# Patient Record
Sex: FEMALE | Race: WHITE | NOT HISPANIC OR LATINO | Employment: UNEMPLOYED | URBAN - NONMETROPOLITAN AREA
[De-identification: names, ages, dates, MRNs, and addresses within clinical notes are randomized per-mention and may not be internally consistent; named-entity substitution may affect disease eponyms.]

---

## 2018-02-28 ENCOUNTER — HOSPITAL ENCOUNTER (OUTPATIENT)
Dept: MAMMOGRAPHY | Facility: HOSPITAL | Age: 58
Discharge: HOME | End: 2018-02-28
Payer: COMMERCIAL

## 2018-02-28 DIAGNOSIS — Z13.9 SCREENING: ICD-10-CM

## 2018-02-28 PROCEDURE — 77063 BREAST TOMOSYNTHESIS BI: CPT

## 2018-02-28 PROCEDURE — 77067 SCR MAMMO BI INCL CAD: CPT

## 2018-08-14 ENCOUNTER — OFFICE VISIT (OUTPATIENT)
Dept: URGENT CARE | Facility: CLINIC | Age: 58
End: 2018-08-14
Payer: COMMERCIAL

## 2018-08-14 VITALS
TEMPERATURE: 98.4 F | OXYGEN SATURATION: 97 % | WEIGHT: 159 LBS | BODY MASS INDEX: 25.55 KG/M2 | HEIGHT: 66 IN | RESPIRATION RATE: 16 BRPM | HEART RATE: 78 BPM | SYSTOLIC BLOOD PRESSURE: 154 MMHG | DIASTOLIC BLOOD PRESSURE: 89 MMHG

## 2018-08-14 DIAGNOSIS — N39.0 URINARY TRACT INFECTION WITH HEMATURIA, SITE UNSPECIFIED: Primary | ICD-10-CM

## 2018-08-14 DIAGNOSIS — R30.0 DYSURIA: ICD-10-CM

## 2018-08-14 DIAGNOSIS — R31.9 URINARY TRACT INFECTION WITH HEMATURIA, SITE UNSPECIFIED: Primary | ICD-10-CM

## 2018-08-14 LAB
SL AMB  POCT GLUCOSE, UA: ABNORMAL
SL AMB LEUKOCYTE ESTERASE,UA: ABNORMAL
SL AMB POCT BILIRUBIN,UA: ABNORMAL
SL AMB POCT BLOOD,UA: ABNORMAL
SL AMB POCT CLARITY,UA: ABNORMAL
SL AMB POCT COLOR,UA: YELLOW
SL AMB POCT KETONES,UA: ABNORMAL
SL AMB POCT NITRITE,UA: ABNORMAL
SL AMB POCT PH,UA: 7.5
SL AMB POCT SPECIFIC GRAVITY,UA: 1
SL AMB POCT URINE PROTEIN: ABNORMAL
SL AMB POCT UROBILINOGEN: 0.2

## 2018-08-14 PROCEDURE — 87086 URINE CULTURE/COLONY COUNT: CPT | Performed by: PHYSICIAN ASSISTANT

## 2018-08-14 PROCEDURE — 99213 OFFICE O/P EST LOW 20 MIN: CPT | Performed by: PHYSICIAN ASSISTANT

## 2018-08-14 PROCEDURE — 81002 URINALYSIS NONAUTO W/O SCOPE: CPT | Performed by: PHYSICIAN ASSISTANT

## 2018-08-14 RX ORDER — FEXOFENADINE HCL 180 MG/1
180 TABLET ORAL DAILY
COMMUNITY

## 2018-08-14 RX ORDER — OMEPRAZOLE 20 MG/1
CAPSULE, DELAYED RELEASE ORAL
COMMUNITY
Start: 2014-03-30 | End: 2018-09-04 | Stop reason: DRUGHIGH

## 2018-08-14 RX ORDER — SULFAMETHOXAZOLE AND TRIMETHOPRIM 800; 160 MG/1; MG/1
1 TABLET ORAL EVERY 12 HOURS SCHEDULED
Qty: 14 TABLET | Refills: 0 | Status: SHIPPED | OUTPATIENT
Start: 2018-08-14 | End: 2018-08-21

## 2018-08-14 NOTE — PROGRESS NOTES
[facility]    NAME: Ricky Meier is a 60 y o  female  : 1960    MRN: [id number]  DATE: 2018  TIME: 12:21 PM    Assessment and Plan   Urinary tract infection with hematuria, site unspecified [N39 0, R31 9]  1  Urinary tract infection with hematuria, site unspecified  sulfamethoxazole-trimethoprim (BACTRIM DS) 800-160 mg per tablet   2  Dysuria  POCT urine dip auto non-scope    Urine culture       Patient Instructions     Patient Instructions   I have prescribed an antibiotic for the infection  Please take the antibiotic as prescribed and finish the entire prescription  I recommend that the patient takes an over the counter probiotic or eats yogurt with live cultures in it [place]) to keep good bacteria in the gut and help prevent diarrhea  If not improving over the next 7-10 days, follow up with PCP  Go to the emergency department if:   You have severe back, side, or abdominal pain  You have fever and shaking chills  You vomit several times in a row  Contact your primary care provider if:   Your symptoms do not go away, even after treatment  Drink more liquids  Liquids help flush out bacteria that may be causing an infection  Chief Complaint     Chief Complaint   Patient presents with    Possible UTI     x 5 days       History of Present Illness   50-year-old female here with urinary frequency, urgency for the last 2-3 days  Patient took over-the-counter azo with minimal relief  Denies nausea, vomiting, diarrhea, fever or chills  No back pain or abdominal pain  Review of Systems   Review of Systems   Constitutional: Negative for activity change, appetite change, chills, diaphoresis, fatigue, fever and unexpected weight change  HENT: Negative for congestion, dental problem, hearing loss, sinus pressure, sneezing, sore throat, tinnitus, trouble swallowing and voice change  Eyes: Negative for photophobia, redness and visual disturbance  Respiratory: Negative for apnea, cough, chest tightness, shortness of breath, wheezing and stridor  Cardiovascular: Negative for chest pain, palpitations and leg swelling  Gastrointestinal: Negative for abdominal distention, abdominal pain, blood in stool, constipation, diarrhea, nausea and vomiting  Endocrine: Negative for cold intolerance, heat intolerance, polydipsia, polyphagia and polyuria  Genitourinary: Positive for dysuria, frequency and urgency  Negative for difficulty urinating, flank pain and hematuria  Musculoskeletal: Negative for arthralgias, back pain, gait problem, joint swelling, myalgias, neck pain and neck stiffness  Skin: Negative for pallor, rash and wound  Neurological: Negative for dizziness, tremors, seizures, speech difficulty, weakness and headaches  Hematological: Negative for adenopathy  Does not bruise/bleed easily  Psychiatric/Behavioral: Negative for agitation, confusion, dysphoric mood and sleep disturbance  The patient is not nervous/anxious  All other systems reviewed and are negative  Current Medications     Current Outpatient Prescriptions:     fexofenadine (ALLEGRA) 180 MG tablet, Take 180 mg by mouth daily, Disp: , Rfl:     omeprazole (PriLOSEC) 20 mg delayed release capsule, Take by mouth, Disp: , Rfl:     sulfamethoxazole-trimethoprim (BACTRIM DS) 800-160 mg per tablet, Take 1 tablet by mouth every 12 (twelve) hours for 7 days, Disp: 14 tablet, Rfl: 0    Current Allergies     Allergies as of 08/14/2018    (No Known Allergies)          The following portions of the patient's history were reviewed and updated as appropriate: allergies, current medications, past family history, past medical history, past social history, past surgical history and problem list    History reviewed  No pertinent past medical history  History reviewed  No pertinent surgical history  History reviewed  No pertinent family history    Social History     Social History    Marital status: /Civil Union     Spouse name: N/A    Number of children: N/A    Years of education: N/A     Occupational History    Not on file  Social History Main Topics    Smoking status: Not on file    Smokeless tobacco: Not on file    Alcohol use Not on file    Drug use: Unknown    Sexual activity: Not on file     Other Topics Concern    Not on file     Social History Narrative    No narrative on file     Medications have been verified  Objective   /89   Pulse 78   Temp 98 4 °F (36 9 °C)   Resp 16   Ht 5' 6" (1 676 m)   Wt 72 1 kg (159 lb)   SpO2 97%   BMI 25 66 kg/m²      Physical Exam   Physical Exam   Constitutional: She appears well-developed and well-nourished  No distress  HENT:   Head: Normocephalic  Right Ear: External ear normal    Left Ear: External ear normal    Nose: Nose normal    Mouth/Throat: Oropharynx is clear and moist  No oropharyngeal exudate  Neck: Normal range of motion  Neck supple  Cardiovascular: Normal rate, regular rhythm and normal heart sounds  No murmur heard  Pulmonary/Chest: Effort normal and breath sounds normal  No respiratory distress  She has no wheezes  She has no rales  Abdominal: Soft  Bowel sounds are normal  There is no tenderness  There is no CVA tenderness  Musculoskeletal: Normal range of motion  Lymphadenopathy:     She has no cervical adenopathy  Skin: Skin is warm  No rash noted  Vitals reviewed

## 2018-08-15 LAB — BACTERIA UR CULT: NORMAL

## 2018-08-31 ENCOUNTER — LAB (OUTPATIENT)
Dept: LAB | Facility: HOSPITAL | Age: 58
End: 2018-08-31
Payer: COMMERCIAL

## 2018-08-31 ENCOUNTER — OFFICE VISIT (OUTPATIENT)
Dept: INTERNAL MEDICINE CLINIC | Facility: CLINIC | Age: 58
End: 2018-08-31
Payer: COMMERCIAL

## 2018-08-31 VITALS
SYSTOLIC BLOOD PRESSURE: 138 MMHG | BODY MASS INDEX: 25.65 KG/M2 | DIASTOLIC BLOOD PRESSURE: 90 MMHG | WEIGHT: 159.6 LBS | TEMPERATURE: 99.8 F | HEIGHT: 66 IN | OXYGEN SATURATION: 98 % | HEART RATE: 83 BPM

## 2018-08-31 DIAGNOSIS — N20.0 NEPHROLITHIASIS: ICD-10-CM

## 2018-08-31 DIAGNOSIS — Z13.6 SCREENING FOR CARDIOVASCULAR CONDITION: ICD-10-CM

## 2018-08-31 DIAGNOSIS — R03.0 ELEVATED BLOOD PRESSURE READING: ICD-10-CM

## 2018-08-31 DIAGNOSIS — R03.0 ELEVATED BLOOD PRESSURE READING: Primary | ICD-10-CM

## 2018-08-31 DIAGNOSIS — Z12.39 SCREENING FOR BREAST CANCER: ICD-10-CM

## 2018-08-31 DIAGNOSIS — Z12.4 SCREENING FOR CERVICAL CANCER: ICD-10-CM

## 2018-08-31 LAB
ALBUMIN SERPL BCP-MCNC: 3.6 G/DL (ref 3.5–5)
ALP SERPL-CCNC: 81 U/L (ref 46–116)
ALT SERPL W P-5'-P-CCNC: 28 U/L (ref 12–78)
ANION GAP SERPL CALCULATED.3IONS-SCNC: 6 MMOL/L (ref 4–13)
AST SERPL W P-5'-P-CCNC: 17 U/L (ref 5–45)
BASOPHILS # BLD AUTO: 0.06 THOUSANDS/ΜL (ref 0–0.1)
BASOPHILS NFR BLD AUTO: 1 % (ref 0–1)
BILIRUB SERPL-MCNC: 0.34 MG/DL (ref 0.2–1)
BUN SERPL-MCNC: 18 MG/DL (ref 5–25)
CALCIUM SERPL-MCNC: 9.4 MG/DL (ref 8.3–10.1)
CHLORIDE SERPL-SCNC: 105 MMOL/L (ref 100–108)
CHOLEST SERPL-MCNC: 211 MG/DL (ref 50–200)
CO2 SERPL-SCNC: 28 MMOL/L (ref 21–32)
CREAT SERPL-MCNC: 0.72 MG/DL (ref 0.6–1.3)
EOSINOPHIL # BLD AUTO: 0.18 THOUSAND/ΜL (ref 0–0.61)
EOSINOPHIL NFR BLD AUTO: 3 % (ref 0–6)
ERYTHROCYTE [DISTWIDTH] IN BLOOD BY AUTOMATED COUNT: 13.3 % (ref 11.6–15.1)
GFR SERPL CREATININE-BSD FRML MDRD: 92 ML/MIN/1.73SQ M
GLUCOSE SERPL-MCNC: 80 MG/DL (ref 65–140)
HCT VFR BLD AUTO: 44.3 % (ref 34.8–46.1)
HDLC SERPL-MCNC: 49 MG/DL (ref 40–60)
HGB BLD-MCNC: 14.2 G/DL (ref 11.5–15.4)
IMM GRANULOCYTES # BLD AUTO: 0.02 THOUSAND/UL (ref 0–0.2)
IMM GRANULOCYTES NFR BLD AUTO: 0 % (ref 0–2)
LDLC SERPL CALC-MCNC: 141 MG/DL (ref 0–100)
LYMPHOCYTES # BLD AUTO: 2 THOUSANDS/ΜL (ref 0.6–4.47)
LYMPHOCYTES NFR BLD AUTO: 32 % (ref 14–44)
MCH RBC QN AUTO: 29.8 PG (ref 26.8–34.3)
MCHC RBC AUTO-ENTMCNC: 32.1 G/DL (ref 31.4–37.4)
MCV RBC AUTO: 93 FL (ref 82–98)
MONOCYTES # BLD AUTO: 0.39 THOUSAND/ΜL (ref 0.17–1.22)
MONOCYTES NFR BLD AUTO: 6 % (ref 4–12)
NEUTROPHILS # BLD AUTO: 3.53 THOUSANDS/ΜL (ref 1.85–7.62)
NEUTS SEG NFR BLD AUTO: 58 % (ref 43–75)
NONHDLC SERPL-MCNC: 162 MG/DL
NRBC BLD AUTO-RTO: 0 /100 WBCS
PLATELET # BLD AUTO: 327 THOUSANDS/UL (ref 149–390)
PMV BLD AUTO: 10 FL (ref 8.9–12.7)
POTASSIUM SERPL-SCNC: 3.8 MMOL/L (ref 3.5–5.3)
PROT SERPL-MCNC: 8 G/DL (ref 6.4–8.2)
RBC # BLD AUTO: 4.76 MILLION/UL (ref 3.81–5.12)
SODIUM SERPL-SCNC: 139 MMOL/L (ref 136–145)
TRIGL SERPL-MCNC: 104 MG/DL
TSH SERPL DL<=0.05 MIU/L-ACNC: 1.68 UIU/ML (ref 0.36–3.74)
WBC # BLD AUTO: 6.18 THOUSAND/UL (ref 4.31–10.16)

## 2018-08-31 PROCEDURE — 80053 COMPREHEN METABOLIC PANEL: CPT

## 2018-08-31 PROCEDURE — 99204 OFFICE O/P NEW MOD 45 MIN: CPT | Performed by: NURSE PRACTITIONER

## 2018-08-31 PROCEDURE — 1036F TOBACCO NON-USER: CPT | Performed by: NURSE PRACTITIONER

## 2018-08-31 PROCEDURE — 36415 COLL VENOUS BLD VENIPUNCTURE: CPT

## 2018-08-31 PROCEDURE — 84443 ASSAY THYROID STIM HORMONE: CPT

## 2018-08-31 PROCEDURE — 80061 LIPID PANEL: CPT

## 2018-08-31 PROCEDURE — 3008F BODY MASS INDEX DOCD: CPT | Performed by: NURSE PRACTITIONER

## 2018-08-31 PROCEDURE — 85025 COMPLETE CBC W/AUTO DIFF WBC: CPT

## 2018-08-31 NOTE — PROGRESS NOTES
Assessment/Plan: Will obtain fasting labs today  Patient does use [facility] but will have it done today with [facility]  She was given a script for GYN and is up to date on her other screenings  Her BP today was 138/90  She would like to first get her lab work done and then discuss possible options for her BP  Will bring her back in one month for flu vaccine  She was advised if any issues or concerns to call the office  Will follow up in 6 months  No problem-specific Assessment & Plan notes found for this encounter  Problem List Items Addressed This Visit     Nephrolithiasis    Relevant Orders    Comprehensive metabolic panel    CBC and differential    TSH, 3rd generation with Free T4 reflex    Elevated blood pressure reading - Primary    Relevant Orders    Comprehensive metabolic panel    CBC and differential    TSH, 3rd generation with Free T4 reflex    Screening for cardiovascular condition    Relevant Orders    Lipid panel    Screening for breast cancer    Screening for cervical cancer    Relevant Orders    Ambulatory referral to Obstetrics / Gynecology            Subjective:      Patient ID: Aelks Hunt is a 60 y o  female  Benjamin Mass is here today to establish care as a new patient  She was a patient of Dr Elina Davis  She has a past history of kidney stones with removal, partial hysterectomy, hemorrhoidectomy, elevated BP readings, and reflux  She denies any chest pain, SOB, or palpitations  She denies any depression or anxiety  She denies any drug, alcohol, or tobacco use  She is eating a well balanced diet  She has not had lab work done in years  She did have a mammogram done back in January  She has not had a pap smear since she was in her late 45s  She did have a colonoscopy done several years ago which was normal   She did have an eye exam done several years ago and does see a dentist on a regular basis    She states she has been checking her Bps at home and she thinks she was checking it wrong  She does use an arm cuff and once in awhile will have high readings  She would like to have the Flu vaccine next month as well  She offers no other issues or concerns  The following portions of the patient's history were reviewed and updated as appropriate:   She  has a past medical history of GERD (gastroesophageal reflux disease); Hemorrhoids; and Hypertension  She   Patient Active Problem List    Diagnosis Date Noted    Elevated blood pressure reading 08/31/2018    Screening for cardiovascular condition 08/31/2018    Screening for breast cancer 08/31/2018    Screening for cervical cancer 08/31/2018    Nephrolithiasis 04/22/2014     She  has a past surgical history that includes Cystoscopy w/ ureteral stent placement (Right, 04/06/2014); Cystoscopy (06/03/2014); Cystoscopy (04/06/2014); Hemorrhoid surgery; Hysterectomy; and Extracorporeal shock wave lithotripsy (05/21/2014)  Her family history includes Breast cancer in her mother; Hypertension in her brother; Nephrolithiasis in her brother  She  reports that she has never smoked  She has never used smokeless tobacco  She reports that she does not drink alcohol or use drugs  Current Outpatient Prescriptions   Medication Sig Dispense Refill    fexofenadine (ALLEGRA) 180 MG tablet Take 180 mg by mouth daily      omeprazole (PriLOSEC) 20 mg delayed release capsule Take by mouth       No current facility-administered medications for this visit  Current Outpatient Prescriptions on File Prior to Visit   Medication Sig    fexofenadine (ALLEGRA) 180 MG tablet Take 180 mg by mouth daily    omeprazole (PriLOSEC) 20 mg delayed release capsule Take by mouth     No current facility-administered medications on file prior to visit  She has No Known Allergies       Review of Systems   All other systems reviewed and are negative          Objective:      /90 (BP Location: Right arm, Patient Position: Sitting, Cuff Size: Adult) Pulse 83   Temp 99 8 °F (37 7 °C) (Temporal)   Ht 5' 6" (1 676 m)   Wt 72 4 kg (159 lb 9 6 oz)   SpO2 98%   BMI 25 76 kg/m²          Physical Exam   Constitutional: She is oriented to person, place, and time  She appears well-developed and well-nourished  HENT:   Head: Normocephalic and atraumatic  Right Ear: External ear normal    Left Ear: External ear normal    Nose: Nose normal    Mouth/Throat: Oropharynx is clear and moist    Eyes: Conjunctivae and EOM are normal  Pupils are equal, round, and reactive to light  Neck: Normal range of motion  Neck supple  Cardiovascular: Normal rate, regular rhythm, normal heart sounds and intact distal pulses  Pulmonary/Chest: Effort normal and breath sounds normal    Abdominal: Soft  Bowel sounds are normal    Musculoskeletal: Normal range of motion  Neurological: She is alert and oriented to person, place, and time  She has normal reflexes  Skin: Skin is warm and dry  Psychiatric: She has a normal mood and affect  Her behavior is normal  Judgment and thought content normal    Vitals reviewed

## 2018-09-03 NOTE — PROGRESS NOTES
Can you let Valentin Gray know her lab work did come back essentially normal cholesterol is up at 211 we like this less than 200 and her LDL bad cholesterol is up at 141 we like this less than 100    I would like to start her on cholesterol medication if she is willing

## 2018-09-04 DIAGNOSIS — E78.5 HYPERLIPIDEMIA, UNSPECIFIED HYPERLIPIDEMIA TYPE: Primary | ICD-10-CM

## 2018-09-04 DIAGNOSIS — K21.9 GASTROESOPHAGEAL REFLUX DISEASE WITHOUT ESOPHAGITIS: ICD-10-CM

## 2018-09-04 RX ORDER — ATORVASTATIN CALCIUM 10 MG/1
10 TABLET, FILM COATED ORAL DAILY
Qty: 30 TABLET | Refills: 3 | Status: SHIPPED | OUTPATIENT
Start: 2018-09-04 | End: 2018-10-03 | Stop reason: SDUPTHER

## 2018-09-04 RX ORDER — PANTOPRAZOLE SODIUM 40 MG/1
40 TABLET, DELAYED RELEASE ORAL DAILY
Qty: 30 TABLET | Refills: 3 | Status: SHIPPED | OUTPATIENT
Start: 2018-09-04 | End: 2018-12-31 | Stop reason: SDUPTHER

## 2018-09-04 NOTE — PROGRESS NOTES
I will call medication in and she will have to have labs rechecked again in 6 weeks  I can call something else in for her heartburn but if this persists she was advised to call the office

## 2018-10-03 DIAGNOSIS — E78.5 HYPERLIPIDEMIA, UNSPECIFIED HYPERLIPIDEMIA TYPE: ICD-10-CM

## 2018-10-03 RX ORDER — ATORVASTATIN CALCIUM 10 MG/1
10 TABLET, FILM COATED ORAL DAILY
Qty: 90 TABLET | Refills: 3 | Status: SHIPPED | OUTPATIENT
Start: 2018-10-03 | End: 2019-07-23 | Stop reason: SDUPTHER

## 2018-11-02 ENCOUNTER — TELEPHONE (OUTPATIENT)
Dept: INTERNAL MEDICINE CLINIC | Facility: CLINIC | Age: 58
End: 2018-11-02

## 2018-11-02 NOTE — TELEPHONE ENCOUNTER
Patient called requesting an appointment for today  Patient stated that she has a cold  Symptoms: coughing, really really bad sore throat, just don't feel right, passed out twice this morning  Patient passed out sitting on toilet bowl, not having a bm nor forcing herself  She stated that she got a headache and her head felt like it was spinning and then passed out  She did not fall though  Then walking from bathroom to bedroom she didn't make it to her bed, passed out and woke up on the floor  After speaking with you I told patient to go directly to ED and do not drive  Patient said that she feels foolish going to ED cause it's just a cold and she feels better now  Patient is blaming it on taking Robet Fanning at 6:30 this morning and then taking Robitussin also  I told her that she must go to ED because it could possibly be a stroke, or an aneurism, cardiac  Patient stated that she has a $200 co-pay for ED and she doesn't want to pay that just for a cold cause she knows it's just a cold  I repeated to patient that she must be seen in the ED and do not drive

## 2018-11-03 ENCOUNTER — OFFICE VISIT (OUTPATIENT)
Dept: URGENT CARE | Facility: CLINIC | Age: 58
End: 2018-11-03
Payer: COMMERCIAL

## 2018-11-03 VITALS
TEMPERATURE: 98.5 F | SYSTOLIC BLOOD PRESSURE: 140 MMHG | DIASTOLIC BLOOD PRESSURE: 79 MMHG | RESPIRATION RATE: 18 BRPM | OXYGEN SATURATION: 96 % | HEART RATE: 84 BPM

## 2018-11-03 DIAGNOSIS — J01.90 ACUTE SINUSITIS, RECURRENCE NOT SPECIFIED, UNSPECIFIED LOCATION: Primary | ICD-10-CM

## 2018-11-03 PROCEDURE — 99213 OFFICE O/P EST LOW 20 MIN: CPT | Performed by: PHYSICIAN ASSISTANT

## 2018-11-03 RX ORDER — CEFUROXIME AXETIL 500 MG/1
500 TABLET ORAL EVERY 12 HOURS SCHEDULED
Qty: 20 TABLET | Refills: 0 | Status: SHIPPED | OUTPATIENT
Start: 2018-11-03 | End: 2018-11-13

## 2018-11-03 NOTE — PROGRESS NOTES
[facility]    NAME: Socrates Davenport is a 60 y o  female  : 1960    MRN: [id number]  DATE: November 3, 2018  TIME: 11:51 AM    Assessment and Plan   Acute sinusitis, recurrence not specified, unspecified location [J01 90]  1  Acute sinusitis, recurrence not specified, unspecified location  cefuroxime (CEFTIN) 500 mg tablet       Patient Instructions     Patient Instructions   I have prescribed an antibiotic for the infection  Please take the antibiotic as prescribed and finish the entire prescription  I recommend that the patient takes an over the counter probiotic or eats yogurt with live cultures in it [place]) to keep good bacteria in the gut and help prevent diarrhea  Wash hands frequently to prevent the spread of infection  Can use over the counter cough and cold medications to help with symptoms  Ibuprofen and/or tylenol as needed for pain or fever  If not improving over the next 7-10 days, follow up with PCP  Chief Complaint     Chief Complaint   Patient presents with    Cough     Pt c/o a cough and congestion since Wednesday  History of Present Illness   50-year-old female here with complaint of nasal congestion, headache and cough with sore throat for the last 3-4 days  States that is getting progressively worse  Has been using over-the-counter medications with minimal relief  No fever  Review of Systems   Review of Systems   Constitutional: Negative for activity change, appetite change, chills, diaphoresis, fatigue, fever and unexpected weight change  HENT: Positive for congestion, postnasal drip, sinus pressure and sore throat  Negative for dental problem, hearing loss, sneezing, tinnitus, trouble swallowing and voice change  Eyes: Negative for photophobia, redness and visual disturbance  Respiratory: Positive for cough  Negative for apnea, chest tightness, shortness of breath, wheezing and stridor      Cardiovascular: Negative for chest pain, palpitations and leg swelling  Gastrointestinal: Negative for abdominal distention, abdominal pain, blood in stool, constipation, diarrhea, nausea and vomiting  Endocrine: Negative for cold intolerance, heat intolerance, polydipsia, polyphagia and polyuria  Genitourinary: Negative for difficulty urinating, dysuria, flank pain, frequency, hematuria and urgency  Musculoskeletal: Negative for arthralgias, back pain, gait problem, joint swelling, myalgias, neck pain and neck stiffness  Skin: Negative for pallor, rash and wound  Neurological: Negative for dizziness, tremors, seizures, speech difficulty, weakness and headaches  Hematological: Negative for adenopathy  Does not bruise/bleed easily  Psychiatric/Behavioral: Negative for agitation, confusion, dysphoric mood and sleep disturbance  The patient is not nervous/anxious  All other systems reviewed and are negative        Current Medications     Current Outpatient Prescriptions:     atorvastatin (LIPITOR) 10 mg tablet, Take 1 tablet (10 mg total) by mouth daily, Disp: 90 tablet, Rfl: 3    cefuroxime (CEFTIN) 500 mg tablet, Take 1 tablet (500 mg total) by mouth every 12 (twelve) hours for 10 days, Disp: 20 tablet, Rfl: 0    fexofenadine (ALLEGRA) 180 MG tablet, Take 180 mg by mouth daily, Disp: , Rfl:     pantoprazole (PROTONIX) 40 mg tablet, Take 1 tablet (40 mg total) by mouth daily, Disp: 30 tablet, Rfl: 3    Current Allergies     Allergies as of 11/03/2018    (No Known Allergies)          The following portions of the patient's history were reviewed and updated as appropriate: allergies, current medications, past family history, past medical history, past social history, past surgical history and problem list    Past Medical History:   Diagnosis Date    GERD (gastroesophageal reflux disease)     Hemorrhoids     Hypertension      Past Surgical History:   Procedure Laterality Date    CYSTOSCOPY  06/03/2014    with removal of object    CYSTOSCOPY  04/06/2014    with resection of tumor    CYSTOSCOPY W/ URETERAL STENT PLACEMENT Right 04/06/2014    EXTRACORPOREAL SHOCK WAVE LITHOTRIPSY  05/21/2014    whole body    HEMORRHOID SURGERY      HYSTERECTOMY       Family History   Problem Relation Age of Onset    Breast cancer Mother     Hypertension Brother     Nephrolithiasis Brother      Social History     Social History    Marital status: /Civil Union     Spouse name: N/A    Number of children: N/A    Years of education: N/A     Occupational History    Not on file  Social History Main Topics    Smoking status: Never Smoker    Smokeless tobacco: Never Used    Alcohol use No    Drug use: No    Sexual activity: Not on file     Other Topics Concern    Not on file     Social History Narrative    No narrative on file     Medications have been verified  Objective   /79   Pulse 84   Temp 98 5 °F (36 9 °C)   Resp 18   SpO2 96%      Physical Exam   Physical Exam   Constitutional: She appears well-developed and well-nourished  No distress  HENT:   Head: Normocephalic  Right Ear: Tympanic membrane and external ear normal    Left Ear: Tympanic membrane and external ear normal    Nose: Mucosal edema present  Mouth/Throat: Posterior oropharyngeal erythema (thick post nasal drip) present  No oropharyngeal exudate  Neck: Normal range of motion  Neck supple  Cardiovascular: Normal rate, regular rhythm and normal heart sounds  No murmur heard  Pulmonary/Chest: Effort normal and breath sounds normal  No respiratory distress  She has no wheezes  She has no rales  Abdominal: Soft  Bowel sounds are normal  There is no tenderness  Musculoskeletal: Normal range of motion  Lymphadenopathy:     She has no cervical adenopathy  Skin: Skin is warm  No rash noted  Vitals reviewed

## 2018-12-31 DIAGNOSIS — K21.9 GASTROESOPHAGEAL REFLUX DISEASE WITHOUT ESOPHAGITIS: ICD-10-CM

## 2018-12-31 RX ORDER — PANTOPRAZOLE SODIUM 40 MG/1
40 TABLET, DELAYED RELEASE ORAL DAILY
Qty: 90 TABLET | Refills: 3 | Status: SHIPPED | OUTPATIENT
Start: 2018-12-31 | End: 2019-07-23 | Stop reason: SDUPTHER

## 2019-03-14 ENCOUNTER — TRANSCRIBE ORDERS (OUTPATIENT)
Dept: ADMINISTRATIVE | Facility: HOSPITAL | Age: 59
End: 2019-03-14

## 2019-03-14 DIAGNOSIS — Z12.39 SCREENING BREAST EXAMINATION: Primary | ICD-10-CM

## 2019-03-27 ENCOUNTER — HOSPITAL ENCOUNTER (OUTPATIENT)
Dept: MAMMOGRAPHY | Facility: HOSPITAL | Age: 59
Discharge: HOME | End: 2019-03-27
Payer: COMMERCIAL

## 2019-03-27 VITALS — HEIGHT: 66 IN | WEIGHT: 159 LBS | BODY MASS INDEX: 25.55 KG/M2

## 2019-03-27 DIAGNOSIS — Z12.39 SCREENING BREAST EXAMINATION: ICD-10-CM

## 2019-03-27 PROCEDURE — 77063 BREAST TOMOSYNTHESIS BI: CPT

## 2019-03-27 PROCEDURE — 77067 SCR MAMMO BI INCL CAD: CPT

## 2019-04-17 ENCOUNTER — OFFICE VISIT (OUTPATIENT)
Dept: INTERNAL MEDICINE CLINIC | Facility: CLINIC | Age: 59
End: 2019-04-17
Payer: COMMERCIAL

## 2019-04-17 ENCOUNTER — APPOINTMENT (OUTPATIENT)
Dept: LAB | Facility: CLINIC | Age: 59
End: 2019-04-17
Payer: COMMERCIAL

## 2019-04-17 VITALS
SYSTOLIC BLOOD PRESSURE: 118 MMHG | TEMPERATURE: 97.8 F | BODY MASS INDEX: 24.72 KG/M2 | WEIGHT: 153.8 LBS | DIASTOLIC BLOOD PRESSURE: 78 MMHG | HEART RATE: 98 BPM | HEIGHT: 66 IN | OXYGEN SATURATION: 96 %

## 2019-04-17 DIAGNOSIS — E78.2 MIXED HYPERLIPIDEMIA: ICD-10-CM

## 2019-04-17 DIAGNOSIS — J45.20 MILD INTERMITTENT ASTHMA WITHOUT COMPLICATION: ICD-10-CM

## 2019-04-17 DIAGNOSIS — E78.5 HYPERLIPIDEMIA, UNSPECIFIED HYPERLIPIDEMIA TYPE: ICD-10-CM

## 2019-04-17 DIAGNOSIS — Z11.59 NEED FOR HEPATITIS C SCREENING TEST: ICD-10-CM

## 2019-04-17 DIAGNOSIS — E78.2 MIXED HYPERLIPIDEMIA: Primary | ICD-10-CM

## 2019-04-17 DIAGNOSIS — F41.9 ANXIETY: ICD-10-CM

## 2019-04-17 DIAGNOSIS — J30.1 SEASONAL ALLERGIC RHINITIS DUE TO POLLEN: ICD-10-CM

## 2019-04-17 PROBLEM — Z12.4 SCREENING FOR CERVICAL CANCER: Status: RESOLVED | Noted: 2018-08-31 | Resolved: 2019-04-17

## 2019-04-17 PROBLEM — Z13.6 SCREENING FOR CARDIOVASCULAR CONDITION: Status: RESOLVED | Noted: 2018-08-31 | Resolved: 2019-04-17

## 2019-04-17 PROBLEM — Z12.39 SCREENING FOR BREAST CANCER: Status: RESOLVED | Noted: 2018-08-31 | Resolved: 2019-04-17

## 2019-04-17 LAB
ALBUMIN SERPL BCP-MCNC: 4 G/DL (ref 3.5–5)
ALP SERPL-CCNC: 89 U/L (ref 46–116)
ALT SERPL W P-5'-P-CCNC: 28 U/L (ref 12–78)
ANION GAP SERPL CALCULATED.3IONS-SCNC: 5 MMOL/L (ref 4–13)
AST SERPL W P-5'-P-CCNC: 20 U/L (ref 5–45)
BASOPHILS # BLD AUTO: 0.09 THOUSANDS/ΜL (ref 0–0.1)
BASOPHILS NFR BLD AUTO: 1 % (ref 0–1)
BILIRUB SERPL-MCNC: 0.66 MG/DL (ref 0.2–1)
BUN SERPL-MCNC: 18 MG/DL (ref 5–25)
CALCIUM SERPL-MCNC: 9.3 MG/DL (ref 8.3–10.1)
CHLORIDE SERPL-SCNC: 107 MMOL/L (ref 100–108)
CHOLEST SERPL-MCNC: 139 MG/DL (ref 50–200)
CO2 SERPL-SCNC: 28 MMOL/L (ref 21–32)
CREAT SERPL-MCNC: 0.74 MG/DL (ref 0.6–1.3)
EOSINOPHIL # BLD AUTO: 0.37 THOUSAND/ΜL (ref 0–0.61)
EOSINOPHIL NFR BLD AUTO: 5 % (ref 0–6)
ERYTHROCYTE [DISTWIDTH] IN BLOOD BY AUTOMATED COUNT: 12.9 % (ref 11.6–15.1)
GFR SERPL CREATININE-BSD FRML MDRD: 88 ML/MIN/1.73SQ M
GLUCOSE P FAST SERPL-MCNC: 102 MG/DL (ref 65–99)
HCT VFR BLD AUTO: 45.1 % (ref 34.8–46.1)
HDLC SERPL-MCNC: 51 MG/DL (ref 40–60)
HGB BLD-MCNC: 14.9 G/DL (ref 11.5–15.4)
IMM GRANULOCYTES # BLD AUTO: 0.01 THOUSAND/UL (ref 0–0.2)
IMM GRANULOCYTES NFR BLD AUTO: 0 % (ref 0–2)
LDLC SERPL CALC-MCNC: 69 MG/DL (ref 0–100)
LYMPHOCYTES # BLD AUTO: 2.91 THOUSANDS/ΜL (ref 0.6–4.47)
LYMPHOCYTES NFR BLD AUTO: 40 % (ref 14–44)
MCH RBC QN AUTO: 30 PG (ref 26.8–34.3)
MCHC RBC AUTO-ENTMCNC: 33 G/DL (ref 31.4–37.4)
MCV RBC AUTO: 91 FL (ref 82–98)
MONOCYTES # BLD AUTO: 0.36 THOUSAND/ΜL (ref 0.17–1.22)
MONOCYTES NFR BLD AUTO: 5 % (ref 4–12)
NEUTROPHILS # BLD AUTO: 3.62 THOUSANDS/ΜL (ref 1.85–7.62)
NEUTS SEG NFR BLD AUTO: 49 % (ref 43–75)
NONHDLC SERPL-MCNC: 88 MG/DL
NRBC BLD AUTO-RTO: 0 /100 WBCS
PLATELET # BLD AUTO: 324 THOUSANDS/UL (ref 149–390)
PMV BLD AUTO: 10.1 FL (ref 8.9–12.7)
POTASSIUM SERPL-SCNC: 3.9 MMOL/L (ref 3.5–5.3)
PROT SERPL-MCNC: 7.9 G/DL (ref 6.4–8.2)
RBC # BLD AUTO: 4.97 MILLION/UL (ref 3.81–5.12)
SODIUM SERPL-SCNC: 140 MMOL/L (ref 136–145)
TRIGL SERPL-MCNC: 96 MG/DL
TSH SERPL DL<=0.05 MIU/L-ACNC: 1.49 UIU/ML (ref 0.36–3.74)
WBC # BLD AUTO: 7.36 THOUSAND/UL (ref 4.31–10.16)

## 2019-04-17 PROCEDURE — 3008F BODY MASS INDEX DOCD: CPT | Performed by: NURSE PRACTITIONER

## 2019-04-17 PROCEDURE — 1036F TOBACCO NON-USER: CPT | Performed by: NURSE PRACTITIONER

## 2019-04-17 PROCEDURE — 99214 OFFICE O/P EST MOD 30 MIN: CPT | Performed by: NURSE PRACTITIONER

## 2019-04-17 PROCEDURE — 80061 LIPID PANEL: CPT

## 2019-04-17 PROCEDURE — 80053 COMPREHEN METABOLIC PANEL: CPT

## 2019-04-17 PROCEDURE — 85025 COMPLETE CBC W/AUTO DIFF WBC: CPT

## 2019-04-17 PROCEDURE — 84443 ASSAY THYROID STIM HORMONE: CPT

## 2019-04-17 PROCEDURE — 86803 HEPATITIS C AB TEST: CPT

## 2019-04-17 PROCEDURE — 36415 COLL VENOUS BLD VENIPUNCTURE: CPT

## 2019-04-17 RX ORDER — ALBUTEROL SULFATE 90 UG/1
2 AEROSOL, METERED RESPIRATORY (INHALATION) EVERY 6 HOURS PRN
Qty: 1 INHALER | Refills: 5 | Status: SHIPPED | OUTPATIENT
Start: 2019-04-17 | End: 2019-05-17

## 2019-04-17 RX ORDER — ALPRAZOLAM 0.25 MG/1
TABLET ORAL
Qty: 30 TABLET | Refills: 0 | Status: SHIPPED | OUTPATIENT
Start: 2019-04-17 | End: 2019-07-23 | Stop reason: SDUPTHER

## 2019-04-18 LAB — HCV AB SER QL: NORMAL

## 2019-07-23 DIAGNOSIS — E78.5 HYPERLIPIDEMIA, UNSPECIFIED HYPERLIPIDEMIA TYPE: ICD-10-CM

## 2019-07-23 DIAGNOSIS — K21.9 GASTROESOPHAGEAL REFLUX DISEASE WITHOUT ESOPHAGITIS: ICD-10-CM

## 2019-07-23 DIAGNOSIS — F41.9 ANXIETY: ICD-10-CM

## 2019-07-23 RX ORDER — ATORVASTATIN CALCIUM 10 MG/1
10 TABLET, FILM COATED ORAL DAILY
Qty: 90 TABLET | Refills: 3 | Status: SHIPPED | OUTPATIENT
Start: 2019-07-23 | End: 2020-09-14 | Stop reason: SDUPTHER

## 2019-07-23 RX ORDER — ALPRAZOLAM 0.25 MG/1
TABLET ORAL
Qty: 90 TABLET | Refills: 0 | Status: SHIPPED | OUTPATIENT
Start: 2019-07-23

## 2019-07-23 RX ORDER — PANTOPRAZOLE SODIUM 40 MG/1
40 TABLET, DELAYED RELEASE ORAL DAILY
Qty: 90 TABLET | Refills: 3 | Status: SHIPPED | OUTPATIENT
Start: 2019-07-23 | End: 2020-09-14 | Stop reason: SDUPTHER

## 2020-09-14 ENCOUNTER — APPOINTMENT (OUTPATIENT)
Dept: LAB | Facility: CLINIC | Age: 60
End: 2020-09-14
Payer: COMMERCIAL

## 2020-09-14 ENCOUNTER — OFFICE VISIT (OUTPATIENT)
Dept: INTERNAL MEDICINE CLINIC | Facility: CLINIC | Age: 60
End: 2020-09-14
Payer: COMMERCIAL

## 2020-09-14 VITALS
HEIGHT: 66 IN | TEMPERATURE: 96.2 F | BODY MASS INDEX: 24.57 KG/M2 | WEIGHT: 152.9 LBS | DIASTOLIC BLOOD PRESSURE: 80 MMHG | SYSTOLIC BLOOD PRESSURE: 122 MMHG | HEART RATE: 85 BPM | OXYGEN SATURATION: 97 %

## 2020-09-14 DIAGNOSIS — K21.9 GASTROESOPHAGEAL REFLUX DISEASE WITHOUT ESOPHAGITIS: ICD-10-CM

## 2020-09-14 DIAGNOSIS — F41.9 ANXIETY AND DEPRESSION: ICD-10-CM

## 2020-09-14 DIAGNOSIS — E78.2 MIXED HYPERLIPIDEMIA: ICD-10-CM

## 2020-09-14 DIAGNOSIS — F32.A ANXIETY AND DEPRESSION: ICD-10-CM

## 2020-09-14 DIAGNOSIS — J30.1 SEASONAL ALLERGIC RHINITIS DUE TO POLLEN: ICD-10-CM

## 2020-09-14 DIAGNOSIS — Z00.00 ROUTINE ADULT HEALTH MAINTENANCE: ICD-10-CM

## 2020-09-14 DIAGNOSIS — J98.01 BRONCHOSPASM: ICD-10-CM

## 2020-09-14 DIAGNOSIS — E78.5 HYPERLIPIDEMIA, UNSPECIFIED HYPERLIPIDEMIA TYPE: ICD-10-CM

## 2020-09-14 DIAGNOSIS — Z23 NEED FOR INFLUENZA VACCINATION: ICD-10-CM

## 2020-09-14 DIAGNOSIS — J45.20 MILD INTERMITTENT ASTHMA WITHOUT COMPLICATION: ICD-10-CM

## 2020-09-14 DIAGNOSIS — H61.23 BILATERAL IMPACTED CERUMEN: Primary | ICD-10-CM

## 2020-09-14 DIAGNOSIS — Z12.39 SCREENING FOR BREAST CANCER: ICD-10-CM

## 2020-09-14 DIAGNOSIS — Z78.0 POSTMENOPAUSAL: ICD-10-CM

## 2020-09-14 DIAGNOSIS — F41.9 ANXIETY: ICD-10-CM

## 2020-09-14 DIAGNOSIS — Z00.00 ROUTINE ADULT HEALTH MAINTENANCE: Primary | ICD-10-CM

## 2020-09-14 LAB
ALBUMIN SERPL BCP-MCNC: 3.7 G/DL (ref 3.5–5)
ALP SERPL-CCNC: 86 U/L (ref 46–116)
ALT SERPL W P-5'-P-CCNC: 33 U/L (ref 12–78)
ANION GAP SERPL CALCULATED.3IONS-SCNC: 8 MMOL/L (ref 4–13)
AST SERPL W P-5'-P-CCNC: 18 U/L (ref 5–45)
BASOPHILS # BLD AUTO: 0.09 THOUSANDS/ΜL (ref 0–0.1)
BASOPHILS NFR BLD AUTO: 1 % (ref 0–1)
BILIRUB SERPL-MCNC: 0.51 MG/DL (ref 0.2–1)
BUN SERPL-MCNC: 15 MG/DL (ref 5–25)
CALCIUM SERPL-MCNC: 10 MG/DL (ref 8.3–10.1)
CHLORIDE SERPL-SCNC: 107 MMOL/L (ref 100–108)
CHOLEST SERPL-MCNC: 150 MG/DL (ref 50–200)
CO2 SERPL-SCNC: 25 MMOL/L (ref 21–32)
CREAT SERPL-MCNC: 0.74 MG/DL (ref 0.6–1.3)
EOSINOPHIL # BLD AUTO: 0.14 THOUSAND/ΜL (ref 0–0.61)
EOSINOPHIL NFR BLD AUTO: 2 % (ref 0–6)
ERYTHROCYTE [DISTWIDTH] IN BLOOD BY AUTOMATED COUNT: 13.1 % (ref 11.6–15.1)
GFR SERPL CREATININE-BSD FRML MDRD: 88 ML/MIN/1.73SQ M
GLUCOSE P FAST SERPL-MCNC: 127 MG/DL (ref 65–99)
HCT VFR BLD AUTO: 42.8 % (ref 34.8–46.1)
HDLC SERPL-MCNC: 56 MG/DL
HGB BLD-MCNC: 14.1 G/DL (ref 11.5–15.4)
IMM GRANULOCYTES # BLD AUTO: 0.02 THOUSAND/UL (ref 0–0.2)
IMM GRANULOCYTES NFR BLD AUTO: 0 % (ref 0–2)
LDLC SERPL CALC-MCNC: 78 MG/DL (ref 0–100)
LYMPHOCYTES # BLD AUTO: 2.25 THOUSANDS/ΜL (ref 0.6–4.47)
LYMPHOCYTES NFR BLD AUTO: 33 % (ref 14–44)
MCH RBC QN AUTO: 30.6 PG (ref 26.8–34.3)
MCHC RBC AUTO-ENTMCNC: 32.9 G/DL (ref 31.4–37.4)
MCV RBC AUTO: 93 FL (ref 82–98)
MONOCYTES # BLD AUTO: 0.41 THOUSAND/ΜL (ref 0.17–1.22)
MONOCYTES NFR BLD AUTO: 6 % (ref 4–12)
NEUTROPHILS # BLD AUTO: 3.86 THOUSANDS/ΜL (ref 1.85–7.62)
NEUTS SEG NFR BLD AUTO: 58 % (ref 43–75)
NONHDLC SERPL-MCNC: 94 MG/DL
NRBC BLD AUTO-RTO: 0 /100 WBCS
PLATELET # BLD AUTO: 318 THOUSANDS/UL (ref 149–390)
PMV BLD AUTO: 10.2 FL (ref 8.9–12.7)
POTASSIUM SERPL-SCNC: 4.3 MMOL/L (ref 3.5–5.3)
PROT SERPL-MCNC: 8 G/DL (ref 6.4–8.2)
RBC # BLD AUTO: 4.61 MILLION/UL (ref 3.81–5.12)
SODIUM SERPL-SCNC: 140 MMOL/L (ref 136–145)
TRIGL SERPL-MCNC: 78 MG/DL
TSH SERPL DL<=0.05 MIU/L-ACNC: 1.67 UIU/ML (ref 0.36–3.74)
WBC # BLD AUTO: 6.77 THOUSAND/UL (ref 4.31–10.16)

## 2020-09-14 PROCEDURE — 99396 PREV VISIT EST AGE 40-64: CPT | Performed by: NURSE PRACTITIONER

## 2020-09-14 PROCEDURE — 36415 COLL VENOUS BLD VENIPUNCTURE: CPT

## 2020-09-14 PROCEDURE — 90471 IMMUNIZATION ADMIN: CPT | Performed by: NURSE PRACTITIONER

## 2020-09-14 PROCEDURE — 90682 RIV4 VACC RECOMBINANT DNA IM: CPT | Performed by: NURSE PRACTITIONER

## 2020-09-14 PROCEDURE — 85025 COMPLETE CBC W/AUTO DIFF WBC: CPT

## 2020-09-14 PROCEDURE — 80061 LIPID PANEL: CPT

## 2020-09-14 PROCEDURE — 3725F SCREEN DEPRESSION PERFORMED: CPT | Performed by: NURSE PRACTITIONER

## 2020-09-14 PROCEDURE — 84443 ASSAY THYROID STIM HORMONE: CPT

## 2020-09-14 PROCEDURE — 80053 COMPREHEN METABOLIC PANEL: CPT

## 2020-09-14 RX ORDER — PANTOPRAZOLE SODIUM 40 MG/1
40 TABLET, DELAYED RELEASE ORAL DAILY
Qty: 90 TABLET | Refills: 3 | Status: SHIPPED | OUTPATIENT
Start: 2020-09-14 | End: 2021-08-16 | Stop reason: SDUPTHER

## 2020-09-14 RX ORDER — ALBUTEROL SULFATE 90 UG/1
2 AEROSOL, METERED RESPIRATORY (INHALATION) EVERY 6 HOURS PRN
Qty: 1 INHALER | Refills: 5 | Status: SHIPPED | OUTPATIENT
Start: 2020-09-14

## 2020-09-14 RX ORDER — ESCITALOPRAM OXALATE 10 MG/1
10 TABLET ORAL DAILY
Qty: 90 TABLET | Refills: 0 | Status: SHIPPED | OUTPATIENT
Start: 2020-09-14 | End: 2021-08-16 | Stop reason: ALTCHOICE

## 2020-09-14 RX ORDER — ATORVASTATIN CALCIUM 10 MG/1
10 TABLET, FILM COATED ORAL DAILY
Qty: 90 TABLET | Refills: 3 | Status: SHIPPED | OUTPATIENT
Start: 2020-09-14 | End: 2021-08-16 | Stop reason: SDUPTHER

## 2020-09-14 NOTE — PATIENT INSTRUCTIONS
Wellness Visit for Adults   WHAT YOU NEED TO KNOW:   What is a wellness visit? A wellness visit is when you see your healthcare provider to get screened for health problems  You can also get advice on how to stay healthy  Write down your questions so you remember to ask them  Ask your healthcare provider how often you should have a wellness visit  What happens at a wellness visit? Your healthcare provider will ask about your health, and your family history of health problems  This includes high blood pressure, heart disease, and cancer  He or she will ask if you have symptoms that concern you, if you smoke, and about your mood  You may also be asked about your intake of medicines, supplements, food, and alcohol  Any of the following may be done:  · Your weight  will be checked  Your height may also be checked so your body mass index (BMI) can be calculated  Your BMI shows if you are at a healthy weight  · Your blood pressure  and heart rate will be checked  Your temperature may also be checked  · Blood and urine tests  may be done  Blood tests may be done to check your cholesterol levels  Abnormal cholesterol levels increase your risk for heart disease and stroke  You may also need a blood or urine test to check for diabetes if you are at increased risk  Urine tests may be done to look for signs of an infection or kidney disease  · A physical exam  includes checking your heartbeat and lungs with a stethoscope  Your healthcare provider may also check your skin to look for sun damage  · Screening tests  may be recommended  A screening test is done to check for diseases that may not cause symptoms  The screening tests you may need depend on your age, gender, family history, and lifestyle habits  For example, colorectal screening may be recommended if you are 48years old or older  What screening tests do I need if I am a woman? · A Pap smear  is used to screen for cervical cancer   Pap smears are usually done every 3 to 5 years depending on your age  You may need them more often if you have had abnormal Pap smear test results in the past  Ask your healthcare provider how often you should have a Pap smear  · A mammogram  is an x-ray of your breasts to screen for breast cancer  Experts recommend mammograms every 2 years starting at age 48 years  You may need a mammogram at age 52 years or younger if you have an increased risk for breast cancer  Talk to your healthcare provider about when you should start having mammograms and how often you need them  What vaccines might I need? · Get an influenza vaccine  every year  The influenza vaccine protects you from the flu  Several types of viruses cause the flu  The viruses change over time, so new vaccines are made each year  · Get a tetanus-diphtheria (Td) booster vaccine  every 10 years  This vaccine protects you against tetanus and diphtheria  Tetanus is a severe infection that may cause painful muscle spasms and lockjaw  Diphtheria is a severe bacterial infection that causes a thick covering in the back of your mouth and throat  · Get a human papillomavirus (HPV) vaccine  if you are female and aged 23 to 32 or male 23 to 24 and never received it  This vaccine protects you from HPV infection  HPV is the most common infection spread by sexual contact  HPV may also cause vaginal, penile, and anal cancers  · Get a pneumococcal vaccine  if you are aged 72 years or older  The pneumococcal vaccine is an injection given to protect you from pneumococcal disease  Pneumococcal disease is an infection caused by pneumococcal bacteria  The infection may cause pneumonia, meningitis, or an ear infection  · Get a shingles vaccine  if you are aged 61 or older, even if you have had shingles before  The shingles vaccine is an injection to protect you from the varicella-zoster virus  This is the same virus that causes chickenpox   Shingles is a painful rash that develops in people who had chickenpox or have been exposed to the virus  How can I eat healthy? My Plate is a model for planning healthy meals  It shows the types and amounts of foods that should go on your plate  Fruits and vegetables make up about half of your plate, and grains and protein make up the other half  A serving of dairy is included on the side of your plate  The amount of calories and serving sizes you need depends on your age, gender, weight, and height  Examples of healthy foods are listed below:  · Eat a variety of vegetables  such as dark green, red, and orange vegetables  You can also include canned vegetables low in sodium (salt) and frozen vegetables without added butter or sauces  · Eat a variety of fresh fruits , canned fruit in 100% juice, frozen fruit, and dried fruit  · Include whole grains  At least half of the grains you eat should be whole grains  Examples include whole-wheat bread, wheat pasta, brown rice, and whole-grain cereals such as oatmeal     · Eat a variety of protein foods such as seafood (fish and shellfish), lean meat, and poultry without skin (turkey and chicken)  Examples of lean meats include pork leg, shoulder, or tenderloin, and beef round, sirloin, tenderloin, and extra lean ground beef  Other protein foods include eggs and egg substitutes, beans, peas, soy products, nuts, and seeds  · Choose low-fat dairy products such as skim or 1% milk or low-fat yogurt, cheese, and cottage cheese  · Limit unhealthy fats  such as butter, hard margarine, and shortening  How much exercise do I need? Exercise at least 30 minutes per day on most days of the week  Some examples of exercise include walking, biking, dancing, and swimming  You can also fit in more physical activity by taking the stairs instead of the elevator or parking farther away from stores  Include muscle strengthening activities 2 days each week  Regular exercise provides many health benefits  It helps you manage your weight, and decreases your risk for type 2 diabetes, heart disease, stroke, and high blood pressure  Exercise can also help improve your mood  Ask your healthcare provider about the best exercise plan for you  What are some general health and safety guidelines I should follow? · Do not smoke  Nicotine and other chemicals in cigarettes and cigars can cause lung damage  Ask your healthcare provider for information if you currently smoke and need help to quit  E-cigarettes or smokeless tobacco still contain nicotine  Talk to your healthcare provider before you use these products  · Limit alcohol  A drink of alcohol is 12 ounces of beer, 5 ounces of wine, or 1½ ounces of liquor  · Lose weight, if needed  Being overweight increases your risk of certain health conditions  These include heart disease, high blood pressure, type 2 diabetes, and certain types of cancer  · Protect your skin  Do not sunbathe or use tanning beds  Use sunscreen with a SPF 15 or higher  Apply sunscreen at least 15 minutes before you go outside  Reapply sunscreen every 2 hours  Wear protective clothing, hats, and sunglasses when you are outside  · Drive safely  Always wear your seatbelt  Make sure everyone in your car wears a seatbelt  A seatbelt can save your life if you are in an accident  Do not use your cell phone when you are driving  This could distract you and cause an accident  Pull over if you need to make a call or send a text message  · Practice safe sex  Use latex condoms if are sexually active and have more than one partner  Your healthcare provider may recommend screening tests for sexually transmitted infections (STIs)  · Wear helmets, lifejackets, and protective gear  Always wear a helmet when you ride a bike or motorcycle, go skiing, or play sports that could cause a head injury  Wear protective equipment when you play sports   Wear a lifejacket when you are on a boat or doing water sports  CARE AGREEMENT:   You have the right to help plan your care  Learn about your health condition and how it may be treated  Discuss treatment options with your caregivers to decide what care you want to receive  You always have the right to refuse treatment  The above information is an  only  It is not intended as medical advice for individual conditions or treatments  Talk to your doctor, nurse or pharmacist before following any medical regimen to see if it is safe and effective for you  © 2017 [employer] Information is for End User's use only and may not be sold, redistributed or otherwise used for commercial purposes  All illustrations and images included in CareNotes® are the copyrighted property of [employer]  or [employer]

## 2020-09-14 NOTE — PROGRESS NOTES
Assessment/Plan: Will order fasting labs on patient to have done today  Will give script for mammogram and bone density  Will give Flu vaccine today  Did attempt to flush ears patient was not tolerating well  Will start her on Lexapro 10 mg daily and did advise her of side effects  Will bring her back in one month for a recheck  Did tell patient to get OTC Debrox ear drops and if pressure continues will refer to ENT  She was advised if any issues to call the office  No problem-specific Assessment & Plan notes found for this encounter           Problem List Items Addressed This Visit        Digestive    Gastroesophageal reflux disease without esophagitis    Relevant Medications    pantoprazole (PROTONIX) 40 mg tablet    Other Relevant Orders    Comprehensive metabolic panel    CBC and differential    TSH, 3rd generation with Free T4 reflex       Respiratory    Mild intermittent asthma without complication    Relevant Medications    albuterol (Ventolin HFA) 90 mcg/act inhaler    Other Relevant Orders    Comprehensive metabolic panel    CBC and differential    TSH, 3rd generation with Free T4 reflex    Seasonal allergic rhinitis due to pollen    Relevant Orders    Comprehensive metabolic panel    CBC and differential    TSH, 3rd generation with Free T4 reflex       Other    Mixed hyperlipidemia    Relevant Medications    atorvastatin (LIPITOR) 10 mg tablet    Other Relevant Orders    Comprehensive metabolic panel    CBC and differential    TSH, 3rd generation with Free T4 reflex    Lipid panel    Anxiety    Relevant Orders    Comprehensive metabolic panel    CBC and differential    TSH, 3rd generation with Free T4 reflex    Routine adult health maintenance - Primary    Relevant Orders    Comprehensive metabolic panel    CBC and differential    TSH, 3rd generation with Free T4 reflex    Anxiety and depression    Relevant Medications    escitalopram (LEXAPRO) 10 mg tablet    Other Relevant Orders    Comprehensive metabolic panel    CBC and differential    TSH, 3rd generation with Free T4 reflex    Hyperlipidemia    Relevant Medications    atorvastatin (LIPITOR) 10 mg tablet    Other Relevant Orders    Comprehensive metabolic panel    CBC and differential    TSH, 3rd generation with Free T4 reflex    Lipid panel    Comprehensive metabolic panel    CBC and differential    TSH, 3rd generation with Free T4 reflex    Screening for breast cancer    Relevant Orders    Mammo screening bilateral w 3d & cad    Postmenopausal    Relevant Orders    DXA bone density spine hip and pelvis    Comprehensive metabolic panel    CBC and differential    TSH, 3rd generation with Free T4 reflex    Bronchospasm    Relevant Medications    albuterol (Ventolin HFA) 90 mcg/act inhaler      Other Visit Diagnoses     Need for influenza vaccination        Relevant Orders    influenza vaccine, quadrivalent, recombinant, PF, 0 5 mL, for patients 18 yr+ (FLUBLOK) (Completed)            Subjective:      Patient ID: Joesph Renteria is a 63 y o  female  Patricia Sierra is for a routine wellness  She has not been seen in quite some time and her  did pass away last year  She is having anxiety and depression and thought she was doing well on her own without medication but is still having bouts of depression  She states she is willing to try something  She would like lab work done and is due for a bone density and mammogram  She states she is not having any chest pain, SOB, or palpitations  She would like the Flu vaccine  She would like her medications called back in  She is having pressure in both ears and feelings of fullness  She states she is taking Allegra and was putting hydrogen peroxide in her ears at home  She was trying Flonase but was not using it everyday  She does use Q tips but does not go all the way in   She       The following portions of the patient's history were reviewed and updated as appropriate:   She  has a past medical history of GERD (gastroesophageal reflux disease), Hemorrhoids, and Hypertension  She   Patient Active Problem List    Diagnosis Date Noted    Routine adult health maintenance 09/14/2020    Anxiety and depression 09/14/2020    Hyperlipidemia 09/14/2020    Gastroesophageal reflux disease without esophagitis 09/14/2020    Screening for breast cancer 09/14/2020    Postmenopausal 09/14/2020    Bronchospasm 09/14/2020    Mixed hyperlipidemia 04/17/2019    Mild intermittent asthma without complication 94/90/7716    Seasonal allergic rhinitis due to pollen 04/17/2019    Need for hepatitis C screening test 04/17/2019    Anxiety 04/17/2019    Elevated blood pressure reading 08/31/2018    Kidney stone 06/10/2013    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     She  has a past surgical history that includes Cystoscopy w/ ureteral stent placement (Right, 04/06/2014); Cystoscopy (06/03/2014); Cystoscopy (04/06/2014); Hemorrhoid surgery; Extracorporeal shock wave lithotripsy (05/21/2014); Hysterectomy; and Breast excisional biopsy (Left)  Her family history includes Breast cancer in her mother; Hypertension in her brother; Nephrolithiasis in her brother  She  reports that she has never smoked  She has never used smokeless tobacco  She reports that she does not drink alcohol or use drugs    Current Outpatient Medications   Medication Sig Dispense Refill    ALPRAZolam (XANAX) 0 25 mg tablet Take one tablet by mouth daily as needed 90 tablet 0    atorvastatin (LIPITOR) 10 mg tablet Take 1 tablet (10 mg total) by mouth daily 90 tablet 3    fexofenadine (ALLEGRA) 180 MG tablet Take 180 mg by mouth daily      pantoprazole (PROTONIX) 40 mg tablet Take 1 tablet (40 mg total) by mouth daily 90 tablet 3    albuterol (Ventolin HFA) 90 mcg/act inhaler Inhale 2 puffs every 6 (six) hours as needed for wheezing 1 Inhaler 5    escitalopram (LEXAPRO) 10 mg tablet Take 1 tablet (10 mg total) by mouth daily 90 tablet 0     No current facility-administered medications for this visit  Current Outpatient Medications on File Prior to Visit   Medication Sig    ALPRAZolam (XANAX) 0 25 mg tablet Take one tablet by mouth daily as needed    fexofenadine (ALLEGRA) 180 MG tablet Take 180 mg by mouth daily    [DISCONTINUED] atorvastatin (LIPITOR) 10 mg tablet Take 1 tablet (10 mg total) by mouth daily    [DISCONTINUED] pantoprazole (PROTONIX) 40 mg tablet Take 1 tablet (40 mg total) by mouth daily     No current facility-administered medications on file prior to visit  She has No Known Allergies       Depression Screening Follow-up Plan: Patient's depression screening was positive with a PHQ-2 score of 4  Their PHQ-9 score was 6  Review of Systems   Constitutional: Negative  HENT:        Ear fullness   Eyes: Negative  Respiratory: Negative  Cardiovascular: Negative  Gastrointestinal: Negative  Endocrine: Negative  Genitourinary: Negative  Musculoskeletal: Negative  Skin: Negative  Allergic/Immunologic: Positive for environmental allergies  Neurological: Negative  Hematological: Negative  Psychiatric/Behavioral: Negative  Objective:      /80 (BP Location: Right arm, Patient Position: Sitting, Cuff Size: Adult)   Pulse 85   Temp (!) 96 2 °F (35 7 °C) (Temporal)   Ht 5' 6" (1 676 m)   Wt 69 4 kg (152 lb 14 4 oz)   SpO2 97%   BMI 24 68 kg/m²          Physical Exam  Vitals signs reviewed  Constitutional:       Appearance: Normal appearance  She is normal weight  HENT:      Head: Normocephalic and atraumatic  Right Ear: There is impacted cerumen  Left Ear: There is impacted cerumen  Nose: Nose normal       Mouth/Throat:      Mouth: Mucous membranes are moist       Pharynx: Oropharynx is clear  Eyes:      Extraocular Movements: Extraocular movements intact  Conjunctiva/sclera: Conjunctivae normal       Pupils: Pupils are equal, round, and reactive to light     Neck: Musculoskeletal: Normal range of motion and neck supple  Cardiovascular:      Rate and Rhythm: Normal rate and regular rhythm  Pulses: Normal pulses  Heart sounds: Normal heart sounds  Pulmonary:      Effort: Pulmonary effort is normal       Breath sounds: Normal breath sounds  Abdominal:      General: Abdomen is flat  Bowel sounds are normal       Palpations: Abdomen is soft  Musculoskeletal: Normal range of motion  Skin:     General: Skin is warm and dry  Capillary Refill: Capillary refill takes less than 2 seconds  Neurological:      General: No focal deficit present  Mental Status: She is alert and oriented to person, place, and time  Mental status is at baseline  Psychiatric:         Mood and Affect: Mood normal          Behavior: Behavior normal          Thought Content:  Thought content normal          Judgment: Judgment normal

## 2020-09-15 ENCOUNTER — OFFICE VISIT (OUTPATIENT)
Dept: OTOLARYNGOLOGY | Facility: CLINIC | Age: 60
End: 2020-09-15
Payer: COMMERCIAL

## 2020-09-15 VITALS
WEIGHT: 153 LBS | TEMPERATURE: 99.1 F | HEART RATE: 90 BPM | DIASTOLIC BLOOD PRESSURE: 80 MMHG | HEIGHT: 66 IN | OXYGEN SATURATION: 99 % | SYSTOLIC BLOOD PRESSURE: 130 MMHG | BODY MASS INDEX: 24.59 KG/M2

## 2020-09-15 DIAGNOSIS — H61.23 BILATERAL IMPACTED CERUMEN: Primary | ICD-10-CM

## 2020-09-15 DIAGNOSIS — H60.311 ACUTE DIFFUSE OTITIS EXTERNA OF RIGHT EAR: ICD-10-CM

## 2020-09-15 PROCEDURE — 99242 OFF/OP CONSLTJ NEW/EST SF 20: CPT | Performed by: OTOLARYNGOLOGY

## 2020-09-15 PROCEDURE — 69210 REMOVE IMPACTED EAR WAX UNI: CPT | Performed by: OTOLARYNGOLOGY

## 2020-09-15 PROCEDURE — 1036F TOBACCO NON-USER: CPT | Performed by: OTOLARYNGOLOGY

## 2020-09-15 RX ORDER — OFLOXACIN 3 MG/ML
SOLUTION/ DROPS OPHTHALMIC
Qty: 5 ML | Refills: 0 | Status: SHIPPED | OUTPATIENT
Start: 2020-09-15

## 2020-09-15 NOTE — PROGRESS NOTES
Consultation - Otolaryngology - Head and Neck Surgery  Facial Plastic and Reconstructive Surgery  Joesph Renteria 63 y o  female MRN: [id number]  Encounter: [id number]        Assessment/Plan:  1  Bilateral impacted cerumen     2  Acute diffuse otitis externa of right ear  ofloxacin (OCUFLOX) 0 3 % ophthalmic solution       Cerumen impaction:  We discussed the nature of cerumen impaction  We discussed appropriate treat with hydrogen peroxide 4-5 drops once per week  We discussed discontinuing the use of any Q-Tips or other objects into the ear  She feels that her hearing is significantly improved following debridement  Offered her the option for an audiogram that she defers at this time  Recommend Floxin drops to the right ear for the next 7-10 days  Follow-up as needed  History of Present Illness   Physician Requesting Consult: Corey Kelley  Reason for Consult / Principal Problem:  Blocked ears  HPI: Joesph Renteria is a 63y o  year old female who presents with bilateral blocked ears  She states this started a couple weeks ago  She was seen by her PCP yesterday who noted bilateral cerumen impactions and attempted debridement  This is partially removed though unable to completely debride  Also had some bleeding from the right ear following debridement  No sudden hearing changes  No pain or discharge  No other concerns at this time  Review of systems:  ROS was performed by the MA and documented in the attached note  This was reviewed personally  Historical Information   Past Medical History:   Diagnosis Date    GERD (gastroesophageal reflux disease)     Hemorrhoids     Hypertension      Past Surgical History:   Procedure Laterality Date    BREAST EXCISIONAL BIOPSY Left     benign; approx   20 years ago     CYSTOSCOPY  06/03/2014    with removal of object    CYSTOSCOPY  04/06/2014    with resection of tumor    CYSTOSCOPY W/ URETERAL STENT PLACEMENT Right 04/06/2014    EXTRACORPOREAL SHOCK WAVE LITHOTRIPSY  05/21/2014    whole body    HEMORRHOID SURGERY      HYSTERECTOMY      age 49     Social History   Social History     Substance and Sexual Activity   Alcohol Use No     Social History     Substance and Sexual Activity   Drug Use No     Social History     Tobacco Use   Smoking Status Never Smoker   Smokeless Tobacco Never Used     Family History:   Family History   Problem Relation Age of Onset    Breast cancer Mother     Hypertension Brother     Nephrolithiasis Brother        Current Outpatient Medications on File Prior to Visit   Medication Sig    albuterol (Ventolin HFA) 90 mcg/act inhaler Inhale 2 puffs every 6 (six) hours as needed for wheezing    ALPRAZolam (XANAX) 0 25 mg tablet Take one tablet by mouth daily as needed    atorvastatin (LIPITOR) 10 mg tablet Take 1 tablet (10 mg total) by mouth daily    escitalopram (LEXAPRO) 10 mg tablet Take 1 tablet (10 mg total) by mouth daily    fexofenadine (ALLEGRA) 180 MG tablet Take 180 mg by mouth daily    pantoprazole (PROTONIX) 40 mg tablet Take 1 tablet (40 mg total) by mouth daily     No current facility-administered medications on file prior to visit  No Known Allergies    Vitals:    09/15/20 1407   BP: 130/80   Pulse: 90   Temp: 99 1 °F (37 3 °C)   SpO2: 99%       Physical Exam   Constitutional: Oriented to person, place, and time  Well-developed and well-nourished, no apparent distress, non-toxic appearance  Cooperative, able to hear and answer questions without difficulty  Voice: Normal voice quality  Head: Normocephalic, atraumatic  No scars, masses or lesions  Face: Symmetric, no edema, no sinus tenderness  Eyes: Vision grossly intact, extra-ocular movement intact  Ears: External ears normal  Bilateral EAC completely impacted with cerumen  S/p debridement, tympanic membranes intact with intact normal landmarks  No post-auricular erythema or tenderness  Nose: Septum intact, nares clear    Mucosa moist, turbinates well appearing  No crusting, polyps or discharge evident  Oral cavity: Dentition intact  Mucosa moist, lips without lesions or masses  Tongue mobile, floor of mouth soft and flat  Hard palate intact  No masses or lesions  Oropharynx: Uvula is midline, soft palate intact without lesion or mass  Oropharyngeal inlet without obstruction  Tonsils unremarkable  Posterior pharyngeal wall clear  No masses or lesions  Salivary glands:  Parotid glands and submandibular glands symmetric, no enlargement or tenderness  Neck: Normal laryngeal elevation with swallow  Trachea midline  No masses or lesions  No palpable adenopathy  Thyroid: Without tenderness or palpable nodules  Pulmonary/Chest: Normal effort and rate  No respiratory distress  No stertor or stridor  Musculoskeletal: Normal range of motion  Neurological: Cranial nerves 2-12 intact  Skin: Skin is warm and dry  Psychiatric: Normal mood and affect  Procedure: Cerumen debridement bilateral    Indications: Cerumen impaction bilateral    Procedure in detail: After informed verbal consent was obtained the ear was visualized using microscopy  Using cerumen loop, suction, and alligator forceps the cerumen was debrided and the findings below were seen  The patient tolerated the procedure well  FINDINGS: Bilateral Tm intact and clear, right EAC scratched and bleeding  Imaging Studies: I have personally reviewed pertinent reports  Lab Results: I have personally reviewed pertinent lab results  Greater than 30 minutes were spent in consultation  More than 1/2 of that time was spent in counselling and coordination of care

## 2021-02-12 ENCOUNTER — IMMUNIZATIONS (OUTPATIENT)
Dept: FAMILY MEDICINE CLINIC | Facility: HOSPITAL | Age: 61
End: 2021-02-12

## 2021-02-12 DIAGNOSIS — Z23 ENCOUNTER FOR IMMUNIZATION: Primary | ICD-10-CM

## 2021-02-12 PROCEDURE — 91301 SARS-COV-2 / COVID-19 MRNA VACCINE (MODERNA) 100 MCG: CPT

## 2021-02-12 PROCEDURE — 0011A SARS-COV-2 / COVID-19 MRNA VACCINE (MODERNA) 100 MCG: CPT

## 2021-03-10 ENCOUNTER — IMMUNIZATIONS (OUTPATIENT)
Dept: FAMILY MEDICINE CLINIC | Facility: HOSPITAL | Age: 61
End: 2021-03-10

## 2021-03-10 DIAGNOSIS — Z23 ENCOUNTER FOR IMMUNIZATION: Primary | ICD-10-CM

## 2021-03-10 PROCEDURE — 0012A SARS-COV-2 / COVID-19 MRNA VACCINE (MODERNA) 100 MCG: CPT

## 2021-03-10 PROCEDURE — 91301 SARS-COV-2 / COVID-19 MRNA VACCINE (MODERNA) 100 MCG: CPT

## 2021-08-16 ENCOUNTER — OFFICE VISIT (OUTPATIENT)
Dept: INTERNAL MEDICINE CLINIC | Facility: CLINIC | Age: 61
End: 2021-08-16
Payer: COMMERCIAL

## 2021-08-16 VITALS
TEMPERATURE: 98.8 F | OXYGEN SATURATION: 98 % | WEIGHT: 147.5 LBS | SYSTOLIC BLOOD PRESSURE: 118 MMHG | BODY MASS INDEX: 23.7 KG/M2 | HEART RATE: 62 BPM | HEIGHT: 66 IN | DIASTOLIC BLOOD PRESSURE: 80 MMHG

## 2021-08-16 DIAGNOSIS — N28.1 ACQUIRED CYSTIC KIDNEY DISEASE: ICD-10-CM

## 2021-08-16 DIAGNOSIS — F32.A ANXIETY AND DEPRESSION: ICD-10-CM

## 2021-08-16 DIAGNOSIS — G47.00 INSOMNIA, UNSPECIFIED TYPE: ICD-10-CM

## 2021-08-16 DIAGNOSIS — K21.9 GASTROESOPHAGEAL REFLUX DISEASE WITHOUT ESOPHAGITIS: ICD-10-CM

## 2021-08-16 DIAGNOSIS — Z12.31 ENCOUNTER FOR SCREENING MAMMOGRAM FOR MALIGNANT NEOPLASM OF BREAST: ICD-10-CM

## 2021-08-16 DIAGNOSIS — F41.9 ANXIETY AND DEPRESSION: ICD-10-CM

## 2021-08-16 DIAGNOSIS — Z00.00 ROUTINE ADULT HEALTH MAINTENANCE: Primary | ICD-10-CM

## 2021-08-16 DIAGNOSIS — E78.2 MIXED HYPERLIPIDEMIA: ICD-10-CM

## 2021-08-16 DIAGNOSIS — J30.1 SEASONAL ALLERGIC RHINITIS DUE TO POLLEN: ICD-10-CM

## 2021-08-16 PROBLEM — E78.5 HYPERLIPIDEMIA: Status: RESOLVED | Noted: 2020-09-14 | Resolved: 2021-08-16

## 2021-08-16 PROCEDURE — 3008F BODY MASS INDEX DOCD: CPT | Performed by: NURSE PRACTITIONER

## 2021-08-16 PROCEDURE — 3725F SCREEN DEPRESSION PERFORMED: CPT | Performed by: NURSE PRACTITIONER

## 2021-08-16 PROCEDURE — 99396 PREV VISIT EST AGE 40-64: CPT | Performed by: NURSE PRACTITIONER

## 2021-08-16 PROCEDURE — 1036F TOBACCO NON-USER: CPT | Performed by: NURSE PRACTITIONER

## 2021-08-16 RX ORDER — TRAZODONE HYDROCHLORIDE 50 MG/1
50 TABLET ORAL
Qty: 90 TABLET | Refills: 0 | Status: SHIPPED | OUTPATIENT
Start: 2021-08-16

## 2021-08-16 RX ORDER — PANTOPRAZOLE SODIUM 40 MG/1
40 TABLET, DELAYED RELEASE ORAL DAILY
Qty: 90 TABLET | Refills: 3 | Status: SHIPPED | OUTPATIENT
Start: 2021-08-16 | End: 2022-08-10

## 2021-08-16 RX ORDER — ATORVASTATIN CALCIUM 10 MG/1
10 TABLET, FILM COATED ORAL DAILY
Qty: 90 TABLET | Refills: 3 | Status: SHIPPED | OUTPATIENT
Start: 2021-08-16 | End: 2022-08-10

## 2021-08-16 NOTE — PROGRESS NOTES
Assessment/Plan: Will repeat fasting labs  Will give script for mammogram  She is deferring GYN  Will call in Trazodone 50 mg as needed at night  She is fully vaccinated  Will notify once labs are back and will follow up in one year or sooner if need be  No problem-specific Assessment & Plan notes found for this encounter           Problem List Items Addressed This Visit        Digestive    Gastroesophageal reflux disease without esophagitis    Relevant Medications    pantoprazole (PROTONIX) 40 mg tablet    Other Relevant Orders    Comprehensive metabolic panel    CBC and differential    TSH, 3rd generation with Free T4 reflex       Respiratory    Seasonal allergic rhinitis due to pollen    Relevant Orders    Comprehensive metabolic panel    CBC and differential    TSH, 3rd generation with Free T4 reflex       Genitourinary    Acquired cystic kidney disease    Relevant Orders    Comprehensive metabolic panel    CBC and differential    TSH, 3rd generation with Free T4 reflex       Other    Mixed hyperlipidemia    Relevant Medications    atorvastatin (LIPITOR) 10 mg tablet    Other Relevant Orders    Comprehensive metabolic panel    CBC and differential    TSH, 3rd generation with Free T4 reflex    Lipid panel    Routine adult health maintenance - Primary    Relevant Orders    Comprehensive metabolic panel    CBC and differential    TSH, 3rd generation with Free T4 reflex    Anxiety and depression    Relevant Medications    traZODone (DESYREL) 50 mg tablet    Other Relevant Orders    Comprehensive metabolic panel    CBC and differential    TSH, 3rd generation with Free T4 reflex    Screening for breast cancer    Relevant Orders    Mammo screening bilateral w 3d & cad    Insomnia    Relevant Medications    traZODone (DESYREL) 50 mg tablet    Other Relevant Orders    Comprehensive metabolic panel    CBC and differential    TSH, 3rd generation with Free T4 reflex            Subjective:      Patient ID: Phyllis Patino is a 57 y o  female  Brian Schaeffer is for a routine wellness  She states she is doing well and is not having any major issue  She is having some issues with insomnia and would like something for this  She would like repeat labs  She denies any chest pain, SOB, or palpitations  She denies any depression or anxiety and states at times she does get down  She is due for a mammogram and is deferring GYN  She states she did have both covid vaccines  She offers no other issues  The following portions of the patient's history were reviewed and updated as appropriate:   She  has a past medical history of GERD (gastroesophageal reflux disease), Hemorrhoids, and Hypertension  She   Patient Active Problem List    Diagnosis Date Noted    Insomnia 08/16/2021    Routine adult health maintenance 09/14/2020    Anxiety and depression 09/14/2020    Gastroesophageal reflux disease without esophagitis 09/14/2020    Screening for breast cancer 09/14/2020    Postmenopausal 09/14/2020    Bronchospasm 09/14/2020    Mixed hyperlipidemia 04/17/2019    Mild intermittent asthma without complication 33/37/7969    Seasonal allergic rhinitis due to pollen 04/17/2019    Need for hepatitis C screening test 04/17/2019    Anxiety 04/17/2019    Elevated blood pressure reading 08/31/2018    Kidney stone 06/10/2013    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     She  has a past surgical history that includes Cystoscopy w/ ureteral stent placement (Right, 04/06/2014); Cystoscopy (06/03/2014); Cystoscopy (04/06/2014); Hemorrhoid surgery; Extracorporeal shock wave lithotripsy (05/21/2014); Hysterectomy; and Breast excisional biopsy (Left)  Her family history includes Breast cancer in her mother; Hypertension in her brother; Nephrolithiasis in her brother  She  reports that she has never smoked  She has never used smokeless tobacco  She reports that she does not drink alcohol and does not use drugs    Current Outpatient Medications Medication Sig Dispense Refill    albuterol (Ventolin HFA) 90 mcg/act inhaler Inhale 2 puffs every 6 (six) hours as needed for wheezing 1 Inhaler 5    ALPRAZolam (XANAX) 0 25 mg tablet Take one tablet by mouth daily as needed 90 tablet 0    fexofenadine (ALLEGRA) 180 MG tablet Take 180 mg by mouth daily      atorvastatin (LIPITOR) 10 mg tablet Take 1 tablet (10 mg total) by mouth daily 90 tablet 3    ofloxacin (OCUFLOX) 0 3 % ophthalmic solution Use 4 drops to the right ear twice daily for 7 days  (Patient not taking: Reported on 8/16/2021) 5 mL 0    pantoprazole (PROTONIX) 40 mg tablet Take 1 tablet (40 mg total) by mouth daily 90 tablet 3    traZODone (DESYREL) 50 mg tablet Take 1 tablet (50 mg total) by mouth daily at bedtime as needed for sleep 90 tablet 0     No current facility-administered medications for this visit  Current Outpatient Medications on File Prior to Visit   Medication Sig    albuterol (Ventolin HFA) 90 mcg/act inhaler Inhale 2 puffs every 6 (six) hours as needed for wheezing    ALPRAZolam (XANAX) 0 25 mg tablet Take one tablet by mouth daily as needed    fexofenadine (ALLEGRA) 180 MG tablet Take 180 mg by mouth daily    [DISCONTINUED] atorvastatin (LIPITOR) 10 mg tablet Take 1 tablet (10 mg total) by mouth daily    [DISCONTINUED] pantoprazole (PROTONIX) 40 mg tablet Take 1 tablet (40 mg total) by mouth daily    ofloxacin (OCUFLOX) 0 3 % ophthalmic solution Use 4 drops to the right ear twice daily for 7 days  (Patient not taking: Reported on 8/16/2021)    [DISCONTINUED] escitalopram (LEXAPRO) 10 mg tablet Take 1 tablet (10 mg total) by mouth daily (Patient not taking: Reported on 8/16/2021)     No current facility-administered medications on file prior to visit  She has No Known Allergies       Review of Systems   All other systems reviewed and are negative          Objective:      /80 (BP Location: Left arm, Patient Position: Sitting, Cuff Size: Adult)   Pulse 62 Temp 98 8 °F (37 1 °C) (Temporal)   Ht 5' 6" (1 676 m)   Wt 66 9 kg (147 lb 8 oz)   SpO2 98%   BMI 23 81 kg/m²          Physical Exam  Vitals reviewed  Constitutional:       Appearance: Normal appearance  She is normal weight  HENT:      Head: Normocephalic and atraumatic  Right Ear: Tympanic membrane, ear canal and external ear normal       Left Ear: Tympanic membrane, ear canal and external ear normal       Nose: Nose normal       Mouth/Throat:      Mouth: Mucous membranes are moist       Pharynx: Oropharynx is clear  Eyes:      Extraocular Movements: Extraocular movements intact  Conjunctiva/sclera: Conjunctivae normal       Pupils: Pupils are equal, round, and reactive to light  Cardiovascular:      Rate and Rhythm: Normal rate and regular rhythm  Pulses: Normal pulses  Heart sounds: Normal heart sounds  Pulmonary:      Effort: Pulmonary effort is normal       Breath sounds: Normal breath sounds  Abdominal:      General: Abdomen is flat  Bowel sounds are normal       Palpations: Abdomen is soft  Musculoskeletal:         General: Normal range of motion  Cervical back: Normal range of motion and neck supple  Skin:     General: Skin is warm and dry  Capillary Refill: Capillary refill takes less than 2 seconds  Neurological:      General: No focal deficit present  Mental Status: She is alert and oriented to person, place, and time  Mental status is at baseline  Psychiatric:         Mood and Affect: Mood normal          Behavior: Behavior normal          Thought Content:  Thought content normal          Judgment: Judgment normal

## 2021-08-23 ENCOUNTER — HOSPITAL ENCOUNTER (OUTPATIENT)
Dept: MAMMOGRAPHY | Facility: HOSPITAL | Age: 61
Discharge: HOME | End: 2021-08-23
Payer: COMMERCIAL

## 2021-08-23 VITALS — WEIGHT: 147 LBS | HEIGHT: 66 IN | BODY MASS INDEX: 23.63 KG/M2

## 2021-08-23 DIAGNOSIS — Z12.31 ENCOUNTER FOR SCREENING MAMMOGRAM FOR MALIGNANT NEOPLASM OF BREAST: ICD-10-CM

## 2021-08-23 PROCEDURE — 77063 BREAST TOMOSYNTHESIS BI: CPT

## 2021-08-23 PROCEDURE — 77067 SCR MAMMO BI INCL CAD: CPT

## 2021-12-13 ENCOUNTER — VBI (OUTPATIENT)
Dept: ADMINISTRATIVE | Facility: OTHER | Age: 61
End: 2021-12-13

## 2022-02-22 ENCOUNTER — VBI (OUTPATIENT)
Dept: ADMINISTRATIVE | Facility: OTHER | Age: 62
End: 2022-02-22

## 2022-08-09 ENCOUNTER — VBI (OUTPATIENT)
Dept: ADMINISTRATIVE | Facility: OTHER | Age: 62
End: 2022-08-09

## 2022-08-10 ENCOUNTER — RA CDI HCC (OUTPATIENT)
Dept: OTHER | Facility: HOSPITAL | Age: 62
End: 2022-08-10

## 2022-08-10 NOTE — PROGRESS NOTES
Supriya [facility] 74  coding opportunities          Chart Reviewed number of suggestions sent to Provider: 2   [phone]  F32  A Depression: found on active problem list, Can Depression be further classified  Patients Insurance        Commercial Insurance: [address] [place] [address]

## 2022-08-15 ENCOUNTER — TELEPHONE (OUTPATIENT)
Dept: OTHER | Facility: OTHER | Age: 62
End: 2022-08-15

## 2022-08-16 NOTE — TELEPHONE ENCOUNTER
Patient is calling regarding cancelling an appointment      Date/Time: 08/17/22 @ 1020    Patient was rescheduled: YES [] NO [x]    Patient requesting call back to reschedule: YES [x] NO []

## 2022-08-29 ENCOUNTER — HOSPITAL ENCOUNTER (OUTPATIENT)
Dept: MAMMOGRAPHY | Facility: HOSPITAL | Age: 62
Discharge: HOME | End: 2022-08-29
Payer: COMMERCIAL

## 2022-08-29 VITALS — BODY MASS INDEX: 23.63 KG/M2 | HEIGHT: 66 IN | WEIGHT: 147 LBS

## 2022-08-29 DIAGNOSIS — Z12.31 ENCOUNTER FOR SCREENING MAMMOGRAM FOR MALIGNANT NEOPLASM OF BREAST: ICD-10-CM

## 2022-08-29 PROCEDURE — 77063 BREAST TOMOSYNTHESIS BI: CPT

## 2022-08-29 PROCEDURE — 77067 SCR MAMMO BI INCL CAD: CPT

## 2022-11-08 DIAGNOSIS — E78.2 MIXED HYPERLIPIDEMIA: ICD-10-CM

## 2022-11-09 RX ORDER — ATORVASTATIN CALCIUM 10 MG/1
10 TABLET, FILM COATED ORAL DAILY
Qty: 90 TABLET | Refills: 3 | Status: SHIPPED | OUTPATIENT
Start: 2022-11-09

## 2022-11-23 ENCOUNTER — VBI (OUTPATIENT)
Dept: ADMINISTRATIVE | Facility: OTHER | Age: 62
End: 2022-11-23

## 2022-11-23 NOTE — TELEPHONE ENCOUNTER
11/23/22 8:06 AM     [employer] SmartForm used to document caregap status      Sarah Hammonds MA Hysterectomy with no documentation

## 2023-01-10 ENCOUNTER — VBI (OUTPATIENT)
Dept: ADMINISTRATIVE | Facility: OTHER | Age: 63
End: 2023-01-10

## 2023-01-10 NOTE — TELEPHONE ENCOUNTER
01/10/23 9:28 AM     [employer] SmartForm used to document caregap status      Sarah Hammonds MA Hysterectomy no documentation cervical absence

## 2024-02-21 PROBLEM — Z00.00 ROUTINE ADULT HEALTH MAINTENANCE: Status: RESOLVED | Noted: 2020-09-14 | Resolved: 2024-02-21

## 2024-02-21 PROBLEM — Z12.39 SCREENING FOR BREAST CANCER: Status: RESOLVED | Noted: 2020-09-14 | Resolved: 2024-02-21

## 2024-02-21 PROBLEM — Z11.59 NEED FOR HEPATITIS C SCREENING TEST: Status: RESOLVED | Noted: 2019-04-17 | Resolved: 2024-02-21

## 2024-10-28 ENCOUNTER — APPOINTMENT (EMERGENCY)
Dept: RADIOLOGY | Facility: HOSPITAL | Age: 64
End: 2024-10-28
Payer: MEDICARE

## 2024-10-28 ENCOUNTER — HOSPITAL ENCOUNTER (EMERGENCY)
Facility: HOSPITAL | Age: 64
Discharge: HOME | End: 2024-10-28
Attending: EMERGENCY MEDICINE
Payer: MEDICARE

## 2024-10-28 VITALS
SYSTOLIC BLOOD PRESSURE: 140 MMHG | WEIGHT: 147.27 LBS | BODY MASS INDEX: 23.77 KG/M2 | OXYGEN SATURATION: 97 % | TEMPERATURE: 100.9 F | HEART RATE: 85 BPM | DIASTOLIC BLOOD PRESSURE: 77 MMHG | RESPIRATION RATE: 18 BRPM

## 2024-10-28 DIAGNOSIS — S52.592A OTHER CLOSED FRACTURE OF DISTAL END OF LEFT RADIUS, INITIAL ENCOUNTER: Primary | ICD-10-CM

## 2024-10-28 PROCEDURE — 25605 CLTX DST RDL FX/EPHYS SEP W/: CPT | Performed by: EMERGENCY MEDICINE

## 2024-10-28 PROCEDURE — 73090 X-RAY EXAM OF FOREARM: CPT

## 2024-10-28 PROCEDURE — 99284 EMERGENCY DEPT VISIT MOD MDM: CPT | Performed by: EMERGENCY MEDICINE

## 2024-10-28 PROCEDURE — 73110 X-RAY EXAM OF WRIST: CPT

## 2024-10-28 PROCEDURE — 99284 EMERGENCY DEPT VISIT MOD MDM: CPT

## 2024-10-28 RX ORDER — LIDOCAINE HYDROCHLORIDE 20 MG/ML
5 INJECTION, SOLUTION EPIDURAL; INFILTRATION; INTRACAUDAL; PERINEURAL ONCE
Status: COMPLETED | OUTPATIENT
Start: 2024-10-28 | End: 2024-10-28

## 2024-10-28 RX ORDER — ACETAMINOPHEN 325 MG/1
975 TABLET ORAL ONCE
Status: COMPLETED | OUTPATIENT
Start: 2024-10-28 | End: 2024-10-28

## 2024-10-28 RX ORDER — OXYCODONE HYDROCHLORIDE 5 MG/1
5 TABLET ORAL ONCE
Status: COMPLETED | OUTPATIENT
Start: 2024-10-28 | End: 2024-10-28

## 2024-10-28 RX ADMIN — OXYCODONE HYDROCHLORIDE 5 MG: 5 TABLET ORAL at 22:18

## 2024-10-28 RX ADMIN — LIDOCAINE HYDROCHLORIDE 5 ML: 20 INJECTION, SOLUTION EPIDURAL; INFILTRATION; INTRACAUDAL at 22:43

## 2024-10-28 RX ADMIN — ACETAMINOPHEN 975 MG: 325 TABLET, FILM COATED ORAL at 22:18

## 2024-10-29 NOTE — DISCHARGE INSTRUCTIONS
Do not get the splint wet, and do not try to  anything with your left hand.    You can take acetaminophen and/or ibuprofen as needed for pain control.    You can contact any orthopedic surgeons listed on these discharge instructions for further evaluation.    If your fingers become numb, you are unable to move your fingers, or if your fingers change color, please go to the emergency department

## 2024-10-29 NOTE — ED PROVIDER NOTES
Time reflects when diagnosis was documented in both MDM as applicable and the Disposition within this note       Time User Action Codes Description Comment    10/28/2024 11:29 PM Juan Manuel Mike Add [H70.157I] Other closed fracture of distal end of left radius, initial encounter           ED Disposition       ED Disposition   Discharge    Condition   Stable    Date/Time   Mon Oct 28, 2024 11:27 PM    Comment   Maricruz PIPER Kast discharge to home/self care.                   Assessment & Plan       Medical Decision Making  Mechanical fall with only focus of injury in the left wrist.  Plain films of left wrist/forearm.  Analgesia.  Obvious clinical evidence of fracture.  Either needs attempted reduction versus splinting without reduction (depending upon degree of angulation on x-ray) and follow-up with orthopedic surgery for definitive management.  No other injuries on secondary survey.  Minor head injury in course of fall with normal neurologic examination now 1 hour post injury in this patient with lack of any major traumatic findings on examination apart from left wrist injury, and no coagulopathy makes this injury not concerning for ICH. Will repeat exam prior to disposition.     Radiographs demonstrated comminuted/displaced left distal radius fracture with no dislocation. Patient NV intact in LUE. Discussed with orthopedic surgery Dr. Kellogg who advised attempted reduction prior to splinting.  This was performed as per the procedure note; sugar-tong splint was applied.  Postreduction radiograph demonstrated mild improvement in alignment. Patient had significant improvement in pain after hematoma block/reduction/splinting with intact NV function.  Patient to be discharged with follow-up to orthopedic surgery.  Acetaminophen/ibuprofen as needed for pain control.  Ambulatory referral to orthopedic surgery placed at discharge.  ED return precautions for signs/symptoms of compartment syndrome.  All questions answered  to satisfaction prior to discharge.  Patient expressed understanding and agreed to plan.        Amount and/or Complexity of Data Reviewed  Radiology: ordered and independent interpretation performed. Decision-making details documented in ED Course.    Risk  OTC drugs.  Prescription drug management.        ED Course as of 10/28/24 2341   Mon Oct 28, 2024   2236 Comminuted displaced left distal radius fracture.  Secure message sent to Dr. Kellogg of orthopedic surgery to discuss optimal management given the complexity of fracture.  He advises attempted reduction.  Will do so and then place in sugar-tong splint.   2335 XR wrist 3+ views LEFT  Somewhat improved alignment postreduction.  No dislocation.       Medications   acetaminophen (TYLENOL) tablet 975 mg (975 mg Oral Given 10/28/24 2218)   oxyCODONE (ROXICODONE) IR tablet 5 mg (5 mg Oral Given 10/28/24 2218)   lidocaine (PF) (XYLOCAINE-MPF) 2 % injection 5 mL (5 mL Infiltration Given 10/28/24 2243)       ED Risk Strat Scores               SBIRT 22yo+      Flowsheet Row Most Recent Value   Initial Alcohol Screen: US AUDIT-C     1. How often do you have a drink containing alcohol? 0 Filed at: 10/28/2024 2205   2. How many drinks containing alcohol do you have on a typical day you are drinking?  0 Filed at: 10/28/2024 2205   3a. Male UNDER 65: How often do you have five or more drinks on one occasion? 0 Filed at: 10/28/2024 2205   3b. FEMALE Any Age, or MALE 65+: How often do you have 4 or more drinks on one occassion? 0 Filed at: 10/28/2024 2205   Audit-C Score 0 Filed at: 10/28/2024 2205   MONTRELL: How many times in the past year have you...    Used an illegal drug or used a prescription medication for non-medical reasons? Never Filed at: 10/28/2024 2205                            History of Present Illness       Chief Complaint   Patient presents with    Fall     Pt fell while walking down carpeted steps c\o left wrist pain        Past Medical History:   Diagnosis Date     GERD (gastroesophageal reflux disease)     Hemorrhoids     Hypertension       Past Surgical History:   Procedure Laterality Date    BREAST EXCISIONAL BIOPSY Left     benign; approx. 20 years ago     CYSTOSCOPY  06/03/2014    with removal of object    CYSTOSCOPY  04/06/2014    with resection of tumor    CYSTOSCOPY W/ URETERAL STENT PLACEMENT Right 04/06/2014    EXTRACORPOREAL SHOCK WAVE LITHOTRIPSY  05/21/2014    whole body    HEMORRHOID SURGERY      HYSTERECTOMY      age 47      Family History   Problem Relation Age of Onset    Breast cancer Mother 53    Hypertension Brother     Nephrolithiasis Brother     No Known Problems Father     No Known Problems Sister     No Known Problems Maternal Grandmother     No Known Problems Maternal Grandfather     No Known Problems Paternal Grandmother     No Known Problems Paternal Grandfather     Cancer Maternal Aunt         unknown origin     Cancer Maternal Aunt         unknown origin     Cancer Maternal Aunt         unknown origin     No Known Problems Sister     No Known Problems Sister       Social History     Tobacco Use    Smoking status: Never    Smokeless tobacco: Never   Vaping Use    Vaping status: Never Used   Substance Use Topics    Alcohol use: No    Drug use: No      E-Cigarette/Vaping    E-Cigarette Use Never User       E-Cigarette/Vaping Substances      I have reviewed and agree with the history as documented.     64-year-old woman presents to the emergency department with painful deformity of the left wrist after fall down 8 steps while visiting her niece's home approximately 1 hour ago; patient states that she lost balance or tripped and fell down the stairs, landing on her left forearm/wrist in the course the fall.  Did strike her head without LOC in course of the fall. Only complaint of pain is in the wrist, worse with any attempted range of motion at the wrist, active range of motion in the fingers of the left hand, and any attempted range of motion at the  left elbow.  No complaint of headache/neck pain/chest pain/back pain/dyspnea/abdominal pain.  No paresthesias or weakness in the fingers of the left hand.  No prior fracture or surgery in the left upper extremity.  Did take naproxen without any significant improvement in symptoms. She is right-handed.            History provided by:  Medical records, patient and significant other  Fall  Associated symptoms: no abdominal pain, no chest pain, no headaches, no nausea and no vomiting        Review of Systems   Constitutional: Negative.    Eyes:  Negative for photophobia and visual disturbance.   Respiratory: Negative.  Negative for chest tightness and shortness of breath.    Cardiovascular: Negative.  Negative for chest pain, palpitations and leg swelling.   Gastrointestinal: Negative.  Negative for abdominal pain, nausea and vomiting.   Genitourinary:  Negative for flank pain.   Musculoskeletal:  Positive for arthralgias and myalgias.   Skin: Negative.    Neurological:  Negative for syncope, weakness, light-headedness, numbness and headaches.   Hematological: Negative.            Objective       ED Triage Vitals   Temperature Pulse Blood Pressure Respirations SpO2 Patient Position - Orthostatic VS   10/28/24 2215 10/28/24 2202 10/28/24 2202 10/28/24 2202 10/28/24 2202 10/28/24 2202   (!) 100.9 °F (38.3 °C) 99 (!) 180/86 18 98 % Sitting      Temp Source Heart Rate Source BP Location FiO2 (%) Pain Score    10/28/24 2215 10/28/24 2202 10/28/24 2202 -- 10/28/24 2202    Temporal Monitor Right arm  8      Vitals      Date and Time Temp Pulse SpO2 Resp BP Pain Score FACES Pain Rating User   10/28/24 2218 -- -- -- -- -- 10 - Worst Possible Pain -- AB   10/28/24 2215 100.9 °F (38.3 °C) -- -- -- -- -- -- AB   10/28/24 2202 -- 99 98 % 18 180/86 8 -- RJP            Physical Exam  Vitals and nursing note reviewed.   Constitutional:       General: She is awake. She is in acute distress (mild painful distress).      Appearance:  Normal appearance. She is well-developed.   HENT:      Head: Normocephalic and atraumatic.      Right Ear: Hearing and external ear normal.      Left Ear: Hearing and external ear normal.   Neck:      Trachea: Trachea and phonation normal.   Cardiovascular:      Rate and Rhythm: Normal rate and regular rhythm.      Pulses:           Radial pulses are 2+ on the right side and 2+ on the left side.        Dorsalis pedis pulses are 2+ on the right side and 2+ on the left side.        Posterior tibial pulses are 2+ on the right side and 2+ on the left side.      Heart sounds: Normal heart sounds, S1 normal and S2 normal. No murmur heard.     No friction rub. No gallop.   Pulmonary:      Effort: Pulmonary effort is normal. No respiratory distress.      Breath sounds: Normal breath sounds. No stridor. No decreased breath sounds, wheezing, rhonchi or rales.   Abdominal:      General: There is no distension.      Palpations: There is no mass.      Tenderness: There is no abdominal tenderness. There is no guarding or rebound.   Musculoskeletal:      Cervical back: No signs of trauma. No spinous process tenderness or muscular tenderness.      Comments: No posterior midline T/L spine ttp or stepoff  Gait stable with equal WB through all phases    LUE:  L elbow: no tenderness/deformity. Able to flex/extend through full ROM without difficutly    L forearm: proximal to mid-forearm nontender with no deformity/crepitus/sts     L wrist: both bone deformity with mild dorsal angulation. Fracture crepitus. Abrasions over dorsum of wrist; no deep structure exposed    L hand: no tenderness of metacarpals/digits. Able to range digits 2-5 without difficulty; limited ROM digit 1 d/t worsening pain in the wrist   Skin:     General: Skin is warm and dry.      Findings: Abrasion (superficial abrasions with some ecchymosis dorsum of L wrist) present.   Neurological:      Mental Status: She is alert and oriented to person, place, and time.       "GCS: GCS eye subscore is 4. GCS verbal subscore is 5. GCS motor subscore is 6.      Cranial Nerves: No cranial nerve deficit.      Sensory: No sensory deficit.      Motor: No abnormal muscle tone.      Comments: PERRLA; EOMI. Sensation intact to light touch over face in V1-V3 distribution bilaterally. Facial expressions symmetric. Tongue/uvula midline. Shoulder shrug equal bilaterally. Strength 5/5 in UE/LE bilaterally. Sensation intact to light touch in UE/LE bilaterally.         Results Reviewed       None            XR wrist 3+ views LEFT   ED Interpretation by Juan Manuel Mike DO (10/28 2341)   Somewhat improved alignment postreduction.  No dislocation.      XR wrist 3+ views LEFT   ED Interpretation by Juan Manuel Mike DO (10/28 2229)   Comminuted, displaced distal radius fracture.  No evident ulnar fracture.  No evident dislocation.      XR forearm 2 views LEFT   ED Interpretation by Juan Manuel Mike DO (10/28 2229)   No radial/ulnar shaft fracture.  No evident fracture or dislocation at the elbow.  Distal radius fracture          Orthopedic injury treatment    Date/Time: 10/28/2024 11:10 PM    Performed by: Juan Manuel Mike DO  Authorized by: Juan Manuel Mike DO    Patient Location:  ED  [place] Protocol:  Procedure performed by:  Consent: Verbal consent obtained.  Risks and benefits: risks, benefits and alternatives were discussed  Consent given by: patient  Time out: Immediately prior to procedure a \"time out\" was called to verify the correct patient, procedure, equipment, support staff and site/side marked as required.  Timeout called at: 10/28/2024 11:10 PM.  Patient identity confirmed: verbally with patient and arm band    Injury location: Left wrist.  Neurovascular status: Neurovascularly intact    Distal perfusion: normal    Neurological function: normal    Range of motion: reduced    Local anesthesia used?: Yes    Anesthesia:  Hematoma block  Local anesthetic:  Lidocaine 2% without epinephrine (5 " ml)  Anesthetic total (ml):  5  Immobilization:  Splint (Reduction of fracture followed by splinting)  Splint type:  Sugar tong  Supplies used:  Cotton padding, fiberglass and elastic bandage  Neurovascular status: Neurovascularly intact    Distal perfusion: normal    Neurological function: normal    Range of motion: normal    Patient tolerance:  Patient tolerated the procedure well with no immediate complications      ED Medication and Procedure Management   Prior to Admission Medications   Prescriptions Last Dose Informant Patient Reported? Taking?   ALPRAZolam (XANAX) 0.25 mg tablet  Self No No   Sig: Take one tablet by mouth daily as needed   albuterol (Ventolin HFA) 90 mcg/act inhaler  Self No No   Sig: Inhale 2 puffs every 6 (six) hours as needed for wheezing   atorvastatin (LIPITOR) 10 mg tablet   No No   Sig: TAKE 1 TABLET (10 MG TOTAL) BY MOUTH DAILY   fexofenadine (ALLEGRA) 180 MG tablet  Self Yes No   Sig: Take 180 mg by mouth daily   ofloxacin (OCUFLOX) 0.3 % ophthalmic solution  Self No No   Sig: Use 4 drops to the right ear twice daily for 7 days.   Patient not taking: Reported on 8/16/2021   pantoprazole (PROTONIX) 40 mg tablet   No No   Sig: TAKE 1 TABLET BY MOUTH DAILY   traZODone (DESYREL) 50 mg tablet   No No   Sig: Take 1 tablet (50 mg total) by mouth daily at bedtime as needed for sleep      Facility-Administered Medications: None     Patient's Medications   Discharge Prescriptions    No medications on file       ED SEPSIS DOCUMENTATION   Time reflects when diagnosis was documented in both MDM as applicable and the Disposition within this note       Time User Action Codes Description Comment    10/28/2024 11:29 PM Juan Manuel Mike Add [S52.592A] Other closed fracture of distal end of left radius, initial encounter                  Juan Manuel Mike DO  10/29/24 0000

## 2024-10-30 ENCOUNTER — TELEPHONE (OUTPATIENT)
Dept: OBGYN CLINIC | Facility: CLINIC | Age: 64
End: 2024-10-30

## 2024-11-05 RX ORDER — CEPHALEXIN 500 MG/1
1 CAPSULE ORAL 2 TIMES DAILY
Status: ON HOLD | COMMUNITY
Start: 2024-10-29 | End: 2024-11-15 | Stop reason: ALTCHOICE

## 2024-11-06 ENCOUNTER — PREP FOR PROCEDURE (OUTPATIENT)
Dept: OBGYN CLINIC | Facility: CLINIC | Age: 64
End: 2024-11-06

## 2024-11-06 ENCOUNTER — APPOINTMENT (OUTPATIENT)
Dept: LAB | Facility: HOSPITAL | Age: 64
End: 2024-11-06
Payer: MEDICARE

## 2024-11-06 ENCOUNTER — OFFICE VISIT (OUTPATIENT)
Dept: OBGYN CLINIC | Facility: CLINIC | Age: 64
End: 2024-11-06
Payer: MEDICARE

## 2024-11-06 VITALS
OXYGEN SATURATION: 97 % | BODY MASS INDEX: 24.08 KG/M2 | RESPIRATION RATE: 16 BRPM | WEIGHT: 149.8 LBS | HEIGHT: 66 IN | TEMPERATURE: 99.2 F | SYSTOLIC BLOOD PRESSURE: 166 MMHG | DIASTOLIC BLOOD PRESSURE: 80 MMHG | HEART RATE: 90 BPM

## 2024-11-06 DIAGNOSIS — S52.592A OTHER CLOSED FRACTURE OF DISTAL END OF LEFT RADIUS, INITIAL ENCOUNTER: ICD-10-CM

## 2024-11-06 DIAGNOSIS — S52.592A OTHER CLOSED FRACTURE OF DISTAL END OF LEFT RADIUS, INITIAL ENCOUNTER: Primary | ICD-10-CM

## 2024-11-06 LAB
BASOPHILS # BLD AUTO: 0.09 THOUSANDS/ÂΜL (ref 0–0.1)
BASOPHILS NFR BLD AUTO: 1 % (ref 0–1)
EOSINOPHIL # BLD AUTO: 0.2 THOUSAND/ÂΜL (ref 0–0.61)
EOSINOPHIL NFR BLD AUTO: 2 % (ref 0–6)
ERYTHROCYTE [DISTWIDTH] IN BLOOD BY AUTOMATED COUNT: 12.8 % (ref 11.6–15.1)
HCT VFR BLD AUTO: 41.8 % (ref 34.8–46.1)
HGB BLD-MCNC: 13.6 G/DL (ref 11.5–15.4)
IMM GRANULOCYTES # BLD AUTO: 0.01 THOUSAND/UL (ref 0–0.2)
IMM GRANULOCYTES NFR BLD AUTO: 0 % (ref 0–2)
LYMPHOCYTES # BLD AUTO: 2.86 THOUSANDS/ÂΜL (ref 0.6–4.47)
LYMPHOCYTES NFR BLD AUTO: 32 % (ref 14–44)
MCH RBC QN AUTO: 30.2 PG (ref 26.8–34.3)
MCHC RBC AUTO-ENTMCNC: 32.5 G/DL (ref 31.4–37.4)
MCV RBC AUTO: 93 FL (ref 82–98)
MONOCYTES # BLD AUTO: 0.56 THOUSAND/ÂΜL (ref 0.17–1.22)
MONOCYTES NFR BLD AUTO: 6 % (ref 4–12)
NEUTROPHILS # BLD AUTO: 5.17 THOUSANDS/ÂΜL (ref 1.85–7.62)
NEUTS SEG NFR BLD AUTO: 59 % (ref 43–75)
NRBC BLD AUTO-RTO: 0 /100 WBCS
PLATELET # BLD AUTO: 309 THOUSANDS/UL (ref 149–390)
PMV BLD AUTO: 9.2 FL (ref 8.9–12.7)
RBC # BLD AUTO: 4.51 MILLION/UL (ref 3.81–5.12)
WBC # BLD AUTO: 8.89 THOUSAND/UL (ref 4.31–10.16)

## 2024-11-06 PROCEDURE — 29125 APPL SHORT ARM SPLINT STATIC: CPT | Performed by: STUDENT IN AN ORGANIZED HEALTH CARE EDUCATION/TRAINING PROGRAM

## 2024-11-06 PROCEDURE — 85025 COMPLETE CBC W/AUTO DIFF WBC: CPT

## 2024-11-06 PROCEDURE — 36415 COLL VENOUS BLD VENIPUNCTURE: CPT

## 2024-11-06 PROCEDURE — 99204 OFFICE O/P NEW MOD 45 MIN: CPT | Performed by: STUDENT IN AN ORGANIZED HEALTH CARE EDUCATION/TRAINING PROGRAM

## 2024-11-06 RX ORDER — CHLORHEXIDINE GLUCONATE ORAL RINSE 1.2 MG/ML
15 SOLUTION DENTAL ONCE
Status: CANCELLED | OUTPATIENT
Start: 2024-11-06 | End: 2024-11-06

## 2024-11-06 RX ORDER — OXYCODONE HYDROCHLORIDE 5 MG/1
5 TABLET ORAL EVERY 4 HOURS PRN
Qty: 5 TABLET | Refills: 0 | Status: SHIPPED | OUTPATIENT
Start: 2024-11-06 | End: 2024-11-15

## 2024-11-06 RX ORDER — CEFAZOLIN SODIUM 2 G/50ML
2000 SOLUTION INTRAVENOUS ONCE
Status: CANCELLED | OUTPATIENT
Start: 2024-11-15 | End: 2024-11-06

## 2024-11-06 NOTE — PROGRESS NOTES
ASSESSMENT/PLAN:    Assessment:   Left distal radius fracture.  The most recent imaging of the fracture demonstrates an unacceptable alignment with increased dorsal tilt, radial shortening, loss of radial inclination, and intra-articular extension. According to the AAOS distal radius fracture management guidelines, acceptable parameters are fractures with less than 3 mm of radial shortening, less than 10 degrees of dorsal tilt, and intra-articular displacement and step-off of less than 2 mm.  The physiology of a fractured bone was discussed with the patient today.  With displaced fractures, operative treatment often results in a functional recovery.  Typically, these fractures undergo reduction either through percutaneous or open methods depending on the location and fracture pattern.  Radiographs are typically taken at intervals throughout the fracture healing ensure maintenance of reduction and alignment.  If the fracture loses its alignment, revision surgery may be required.  Medical conditions such as diabetes, osteoporosis, vitamin D deficiency, and a history of or exposure to smoking may delay or prevent fracture healing.  The risks and benefits of the procedure were explained to the patient, which include, but are not limited to: Bleeding, infection, recurrence, pain, scar, malunion, nonunion, damage to tendons, damage to nerves, and damage to blood vessels, and complications related to anesthesia, failure to give desire result, need for more surgery.  These risks, along with alternative conservative treatment options, and postoperative protocols were voiced back and understood by the patient.  All questions were answered to the patient's satisfaction.  The patient agrees to comply with a standard postoperative protocol, and is willing to proceed.  We discussed wound care, incision and scar care, and general preoperative information was provided to the patient.  Prior to surgery, the patient may be requested  to stop all anti-inflammatory medications.  Prophylactic aspirin, Plavix, and Coumadin may be allowed to be continued.  Medications including vitamin E., ginkgo, and fish oil are requested to be stopped approximately one week prior to surgery.  Hypertensive medications and beta blockers, if taken, should be continued.    Plan:   I had a discussion with the patient regarding my clinical findings, diagnosis, and treatment plan.  All questions answered.    The patient is indicated for operative fixation  A splint was applied today to stabilize the fracture until surgery  Maintain the injured extremity nonweightbearing.  The splint should be maintained clean and dry.  Elevate the injured extremity to heart level for slightly above.  This will help with the swelling.  Tylenol and oral anti-inflammatories can be taken for pain. The patient was advised that NSAID-type medications have some very important potential side effects including: gastrointestinal irritation including hemorrhage and renal injuries.   Next Visit:  12-14 days after surgery        _____________________________________________________  CHIEF COMPLAINT:  Wrist pain      SUBJECTIVE:  Maricruz Alfaro is a 64 y.o. right hand dominant female presenting for evaluation of a left wrist injury.  The patient states the injury occurred on 10/27/2024 while walking down the stairs. The patient states that she fell down the stairs, struck her head on the wall and landed on the wrist. She was unsure of the position of her wrist. After injury she was evaluated by the ED on 10/28/2024. Images revealed a comminuted, displaced fracture. A reduction was reduced, but was unsuccessful. They were placed in a splint and referred for follow-up.  Since that time their pain has been moderately well-controlled.  They do not have numbness or tingling in the hand including no numbness or tingling in the median nerve distribution.  There is no pain in the elbow or shoulder.  She reports  discomfort in the splint. The patient states that she is very physically active. She enjoys biking and hiking.     DOI: 10/27/2024    Occupation: Retired       PAST MEDICAL HISTORY:  Past Medical History:   Diagnosis Date    GERD (gastroesophageal reflux disease)     Hemorrhoids     Hypertension        PAST SURGICAL HISTORY:  Past Surgical History:   Procedure Laterality Date    BREAST EXCISIONAL BIOPSY Left     benign; approx. 20 years ago     CYSTOSCOPY  06/03/2014    with removal of object    CYSTOSCOPY  04/06/2014    with resection of tumor    CYSTOSCOPY W/ URETERAL STENT PLACEMENT Right 04/06/2014    EXTRACORPOREAL SHOCK WAVE LITHOTRIPSY  05/21/2014    whole body    HEMORRHOID SURGERY      HYSTERECTOMY      age 47       FAMILY HISTORY:  Family History   Problem Relation Age of Onset    Breast cancer Mother 53    Hypertension Brother     Nephrolithiasis Brother     No Known Problems Father     No Known Problems Sister     No Known Problems Maternal Grandmother     No Known Problems Maternal Grandfather     No Known Problems Paternal Grandmother     No Known Problems Paternal Grandfather     Cancer Maternal Aunt         unknown origin     Cancer Maternal Aunt         unknown origin     Cancer Maternal Aunt         unknown origin     No Known Problems Sister     No Known Problems Sister        SOCIAL HISTORY:  Social History     Tobacco Use    Smoking status: Never    Smokeless tobacco: Never   Vaping Use    Vaping status: Never Used   Substance Use Topics    Alcohol use: No    Drug use: No       MEDICATIONS:    Current Outpatient Medications:     albuterol (Ventolin HFA) 90 mcg/act inhaler, Inhale 2 puffs every 6 (six) hours as needed for wheezing, Disp: 1 Inhaler, Rfl: 5    ALPRAZolam (XANAX) 0.25 mg tablet, Take one tablet by mouth daily as needed, Disp: 90 tablet, Rfl: 0    atorvastatin (LIPITOR) 10 mg tablet, TAKE 1 TABLET (10 MG TOTAL) BY MOUTH DAILY, Disp: 90 tablet, Rfl: 3    cephalexin (KEFLEX) 500 mg  "capsule, Take 1 capsule by mouth 2 (two) times a day, Disp: , Rfl:     fexofenadine (ALLEGRA) 180 MG tablet, Take 180 mg by mouth daily, Disp: , Rfl:     ofloxacin (OCUFLOX) 0.3 % ophthalmic solution, Use 4 drops to the right ear twice daily for 7 days., Disp: 5 mL, Rfl: 0    pantoprazole (PROTONIX) 40 mg tablet, TAKE 1 TABLET BY MOUTH DAILY, Disp: 90 tablet, Rfl: 3    traZODone (DESYREL) 50 mg tablet, Take 1 tablet (50 mg total) by mouth daily at bedtime as needed for sleep, Disp: 90 tablet, Rfl: 0    ALLERGIES:  No Known Allergies    REVIEW OF SYSTEMS:  Pertinent items are noted in HPI.  A comprehensive review of systems was negative.    LABS:  HgA1c: No results found for: \"HGBA1C\"  BMP:   Lab Results   Component Value Date    GLUCOSE 98 04/06/2014    CALCIUM 9.9 08/12/2024     04/06/2014    K 4.1 08/12/2024    CO2 26 08/12/2024     08/12/2024    BUN 21 08/12/2024    CREATININE 0.64 08/12/2024         _____________________________________________________  PHYSICAL EXAMINATION:  Vital signs: /80   Pulse 90   Temp 99.2 °F (37.3 °C) (Temporal)   Resp 16   Ht 5' 6\" (1.676 m)   Wt 67.9 kg (149 lb 12.8 oz)   SpO2 97%   BMI 24.18 kg/m²   General: well developed and well nourished, alert, oriented times 3, and appears comfortable  Psychiatric: Normal  HEENT: Trachea Midline, No torticollis  Cardiovascular: No discernable arrhythmia  Pulmonary: No wheezing or stridor  Abdomen: No rebound or guarding  Extremities: No peripheral edema  Skin: No masses, erythema, lacerations, fluctation, ulcerations  Neurovascular: Sensation Intact to the Median, Ulnar, Radial Nerve, Motor Intact to the Median, Ulnar, Radial Nerve, and Pulses Intact    MUSCULOSKELETAL EXAMINATION:  Left wrist  Skin is intact.  There is moderate swelling and ecchymosis about the wrist and diffusely into the hand.  There is generalized tenderness over the distal radius and about the wrist.  There is no tenderness about the elbow or " specifically about the radial head.  Patient has full sensation in all distributions including the median nerve distribution.    TTP:  Scaphoid tubercle: no   Anatomic snuff box: no  SL interval: no   Range of Motion:  Elbow: extension/flexion 0/140  Forearm: pronation/supination deferred  Wrist: Deferred  Digit: full AROM in DIP, PIP, MP joints  Motor Exam: firing AIN/PIN/U  Sensory Exam: Sensation intact to light touch in FDWS (radial), volar IF (median), volar SF (ulnar)  Vascular Exam: < 2 sec capillary refill     _____________________________________________________  STUDIES REVIEWED:  I reviewed imaging in PACS from 10/28/2024 of the left wrist which demonstrates an intra-articular distal radius fracture with excessive dorsal tilt, radial shortening, loss of radial inclination, and articular step-off greater than 2 mm      PROCEDURES PERFORMED:  Splint application    Date/Time: 11/6/2024 9:45 AM    Performed by: Chacho Wong MD  Authorized by: Chacho Wong MD  Universal Protocol:  Consent: Verbal consent obtained.  Risks and benefits: risks, benefits and alternatives were discussed  Consent given by: patient  Patient understanding: patient states understanding of the procedure being performed  Radiology Images displayed and confirmed. If images not available, report reviewed: imaging studies available  Patient identity confirmed: verbally with patient    Pre-procedure details:     Sensation:  Normal  Procedure details:     Laterality:  Left    Location:  Wrist    Wrist:  L wrist    Splint type:  Short arm splint, static (forearm to hand)    Supplies:  Cotton padding, plaster and elastic bandage       Scribe Attestation      I,:  Ty Ferris am acting as a scribe while in the presence of the attending physician.:       I,:  Chacho Wong MD personally performed the services described in this documentation    as scribed in my presence.:

## 2024-11-07 LAB
ATRIAL RATE: 66 BPM
P AXIS: 42 DEGREES
PR INTERVAL: 154 MS
QRS AXIS: -13 DEGREES
QRSD INTERVAL: 82 MS
QT INTERVAL: 372 MS
QTC INTERVAL: 390 MS
T WAVE AXIS: 12 DEGREES
VENTRICULAR RATE: 66 BPM

## 2024-11-07 PROCEDURE — 93010 ELECTROCARDIOGRAM REPORT: CPT | Performed by: INTERNAL MEDICINE

## 2024-11-07 NOTE — PRE-PROCEDURE INSTRUCTIONS
Pre-Surgery Instructions:   Medication Instructions    atorvastatin (LIPITOR) 10 mg tablet Take day of surgery.    cephalexin (KEFLEX) 500 mg capsule Last dose 11/10/24    fexofenadine (ALLEGRA) 180 MG tablet Take night before surgery    oxyCODONE (Roxicodone) 5 immediate release tablet Uses PRN- OK to take day of surgery    pantoprazole (PROTONIX) 40 mg tablet Take day of surgery.      Medication instructions for day surgery reviewed. Please use only a sip of water to take your instructed medications. Avoid all over the counter vitamins, supplements and NSAIDS for one week prior to surgery per anesthesia guidelines. Tylenol is ok to take as needed.     You will receive a call one business day prior to surgery with an arrival time and hospital directions. If your surgery is scheduled on a Monday, the hospital will be calling you on the Friday prior to your surgery. If you have not heard from anyone by 8pm, please call the hospital supervisor through the hospital  at [phone]. ([facility] [phone] or [facility] [phone]).    Do not eat or drink anything after midnight the night before your surgery, including candy, mints, lifesavers, or chewing gum. Do not drink alcohol 24hrs before your surgery. Try not to smoke at least 24hrs before your surgery.       Follow the pre surgery showering instructions as listed in the “My Surgical Experience Booklet” or otherwise provided by your surgeon's office. Do not use a blade to shave the surgical area 1 week before surgery. It is okay to use a clean electric clippers up to 24 hours before surgery. Do not apply any lotions, creams, including makeup, cologne, deodorant, or perfumes after showering on the day of your surgery. Do not use dry shampoo, hair spray, hair gel, or any type of hair products.     No contact lenses, eye make-up, or artificial eyelashes. Remove nail polish, including gel polish, and any artificial, gel, or acrylic nails if possible. Remove  all jewelry including rings and body piercing jewelry.     Wear causal clothing that is easy to take on and off. Consider your type of surgery.    Keep any valuables, jewelry, piercings at home. Please bring any specially ordered equipment (sling, braces) if indicated.    Arrange for a responsible person to drive you to and from the hospital on the day of your surgery. Please confirm the visitor policy for the day of your procedure when you receive your phone call with an arrival time.     Call the surgeon's office with any new illnesses, exposures, or additional questions prior to surgery.    Please reference your “My Surgical Experience Booklet” for additional information to prepare for your upcoming surgery.

## 2024-11-08 ENCOUNTER — APPOINTMENT (OUTPATIENT)
Dept: PREADMISSION TESTING | Facility: HOSPITAL | Age: 64
End: 2024-11-08

## 2024-11-14 ENCOUNTER — ANESTHESIA EVENT (OUTPATIENT)
Dept: PERIOP | Facility: HOSPITAL | Age: 64
End: 2024-11-14
Payer: MEDICARE

## 2024-11-14 NOTE — ANESTHESIA PREPROCEDURE EVALUATION
Procedure:  ORIF INTRA-ARTICULAR DISTAL RADIUS FRACTURE (Left: Wrist)    Relevant Problems   CARDIO   (+) Mixed hyperlipidemia      GI/HEPATIC   (+) Gastroesophageal reflux disease without esophagitis      /RENAL   (+) Acquired cystic kidney disease   (+) Kidney stone      NEURO/PSYCH   (+) Anxiety   (+) Anxiety and depression      PULMONARY   (+) Mild intermittent asthma without complication        Physical Exam    Airway    Mallampati score: II  TM Distance: >3 FB  Neck ROM: full     Dental   No notable dental hx     Cardiovascular  Cardiovascular exam normal    Pulmonary  Pulmonary exam normal     Other Findings  post-pubertal.      Anesthesia Plan  ASA Score- 2     Anesthesia Type- general with ASA Monitors.         Additional Monitors:     Airway Plan:     Comment: Supraclavicular block.       Plan Factors-Exercise tolerance (METS): >4 METS.    Chart reviewed.  Imaging results reviewed. Existing labs reviewed. Patient summary reviewed.    Patient is not a current smoker.      There is medical exclusion for perioperative obstructive sleep apnea risk education.        Induction- intravenous.    Postoperative Plan- Plan for postoperative opioid use.     Perioperative Resuscitation Plan - Level 1 - Full Code.       Informed Consent- Anesthetic plan and risks discussed with patient.  I personally reviewed this patient with the CRNA. Discussed and agreed on the Anesthesia Plan with the CRNA..

## 2024-11-15 ENCOUNTER — ANESTHESIA (OUTPATIENT)
Dept: PERIOP | Facility: HOSPITAL | Age: 64
End: 2024-11-15
Payer: MEDICARE

## 2024-11-15 ENCOUNTER — HOSPITAL ENCOUNTER (OUTPATIENT)
Facility: HOSPITAL | Age: 64
Setting detail: OUTPATIENT SURGERY
Discharge: HOME | End: 2024-11-15
Attending: STUDENT IN AN ORGANIZED HEALTH CARE EDUCATION/TRAINING PROGRAM | Admitting: STUDENT IN AN ORGANIZED HEALTH CARE EDUCATION/TRAINING PROGRAM
Payer: MEDICARE

## 2024-11-15 ENCOUNTER — APPOINTMENT (OUTPATIENT)
Dept: RADIOLOGY | Facility: HOSPITAL | Age: 64
End: 2024-11-15
Payer: MEDICARE

## 2024-11-15 VITALS
RESPIRATION RATE: 16 BRPM | OXYGEN SATURATION: 97 % | TEMPERATURE: 98.4 F | HEART RATE: 61 BPM | DIASTOLIC BLOOD PRESSURE: 66 MMHG | SYSTOLIC BLOOD PRESSURE: 168 MMHG

## 2024-11-15 DIAGNOSIS — S52.572D OTHER CLOSED INTRA-ARTICULAR FRACTURE OF DISTAL END OF LEFT RADIUS WITH ROUTINE HEALING, SUBSEQUENT ENCOUNTER: Primary | ICD-10-CM

## 2024-11-15 PROBLEM — S52.502A CLOSED FRACTURE OF LEFT DISTAL RADIUS: Status: ACTIVE | Noted: 2024-11-15

## 2024-11-15 LAB
ANION GAP SERPL CALCULATED.3IONS-SCNC: 5 MMOL/L (ref 4–13)
BUN SERPL-MCNC: 18 MG/DL (ref 5–25)
CALCIUM SERPL-MCNC: 10 MG/DL (ref 8.4–10.2)
CHLORIDE SERPL-SCNC: 106 MMOL/L (ref 96–108)
CO2 SERPL-SCNC: 29 MMOL/L (ref 21–32)
CREAT SERPL-MCNC: 0.72 MG/DL (ref 0.6–1.3)
GFR SERPL CREATININE-BSD FRML MDRD: 88 ML/MIN/1.73SQ M
GLUCOSE P FAST SERPL-MCNC: 99 MG/DL (ref 65–99)
GLUCOSE SERPL-MCNC: 99 MG/DL (ref 65–140)
POTASSIUM SERPL-SCNC: 3.7 MMOL/L (ref 3.5–5.3)
SODIUM SERPL-SCNC: 140 MMOL/L (ref 135–147)

## 2024-11-15 PROCEDURE — C1713 ANCHOR/SCREW BN/BN,TIS/BN: HCPCS | Performed by: STUDENT IN AN ORGANIZED HEALTH CARE EDUCATION/TRAINING PROGRAM

## 2024-11-15 PROCEDURE — NC001 PR NO CHARGE: Performed by: STUDENT IN AN ORGANIZED HEALTH CARE EDUCATION/TRAINING PROGRAM

## 2024-11-15 PROCEDURE — 25609 OPTX DST RD XART FX/EP SEP3+: CPT | Performed by: PHYSICIAN ASSISTANT

## 2024-11-15 PROCEDURE — 25609 OPTX DST RD XART FX/EP SEP3+: CPT | Performed by: STUDENT IN AN ORGANIZED HEALTH CARE EDUCATION/TRAINING PROGRAM

## 2024-11-15 PROCEDURE — 73100 X-RAY EXAM OF WRIST: CPT

## 2024-11-15 PROCEDURE — 80048 BASIC METABOLIC PNL TOTAL CA: CPT | Performed by: STUDENT IN AN ORGANIZED HEALTH CARE EDUCATION/TRAINING PROGRAM

## 2024-11-15 PROCEDURE — C9290 INJ, BUPIVACAINE LIPOSOME: HCPCS | Performed by: ANESTHESIOLOGY

## 2024-11-15 DEVICE — PEG VOLAR THREADED 14MM: Type: IMPLANTABLE DEVICE | Site: WRIST | Status: FUNCTIONAL

## 2024-11-15 DEVICE — PLATE VOLAR BEARING 3 HOLE LEFT: Type: IMPLANTABLE DEVICE | Site: WRIST | Status: FUNCTIONAL

## 2024-11-15 DEVICE — SCREW LCK 3.2 X 12MM CORTICAL: Type: IMPLANTABLE DEVICE | Site: WRIST | Status: FUNCTIONAL

## 2024-11-15 DEVICE — PEG VOLAR 14MM UNTHREADED: Type: IMPLANTABLE DEVICE | Site: WRIST | Status: FUNCTIONAL

## 2024-11-15 DEVICE — PEG VOLAR THREADED 18MM: Type: IMPLANTABLE DEVICE | Site: WRIST | Status: FUNCTIONAL

## 2024-11-15 DEVICE — SCREW LCK 3.2 X 10MM CORTICAL: Type: IMPLANTABLE DEVICE | Site: WRIST | Status: FUNCTIONAL

## 2024-11-15 DEVICE — SCREW CORTICAL 3.2 X 16MM: Type: IMPLANTABLE DEVICE | Site: WRIST | Status: FUNCTIONAL

## 2024-11-15 RX ORDER — LABETALOL HYDROCHLORIDE 5 MG/ML
5 INJECTION, SOLUTION INTRAVENOUS
Status: DISCONTINUED | OUTPATIENT
Start: 2024-11-15 | End: 2024-11-15 | Stop reason: HOSPADM

## 2024-11-15 RX ORDER — ONDANSETRON 2 MG/ML
INJECTION INTRAMUSCULAR; INTRAVENOUS AS NEEDED
Status: DISCONTINUED | OUTPATIENT
Start: 2024-11-15 | End: 2024-11-15

## 2024-11-15 RX ORDER — FENTANYL CITRATE/PF 50 MCG/ML
25 SYRINGE (ML) INJECTION
Status: DISCONTINUED | OUTPATIENT
Start: 2024-11-15 | End: 2024-11-15 | Stop reason: HOSPADM

## 2024-11-15 RX ORDER — LIDOCAINE HYDROCHLORIDE 10 MG/ML
INJECTION, SOLUTION EPIDURAL; INFILTRATION; INTRACAUDAL; PERINEURAL AS NEEDED
Status: DISCONTINUED | OUTPATIENT
Start: 2024-11-15 | End: 2024-11-15

## 2024-11-15 RX ORDER — CHLORHEXIDINE GLUCONATE ORAL RINSE 1.2 MG/ML
15 SOLUTION DENTAL ONCE
Status: COMPLETED | OUTPATIENT
Start: 2024-11-15 | End: 2024-11-15

## 2024-11-15 RX ORDER — CEFAZOLIN SODIUM 2 G/50ML
2000 SOLUTION INTRAVENOUS ONCE
Status: COMPLETED | OUTPATIENT
Start: 2024-11-15 | End: 2024-11-15

## 2024-11-15 RX ORDER — DEXAMETHASONE SODIUM PHOSPHATE 10 MG/ML
INJECTION, SOLUTION INTRAMUSCULAR; INTRAVENOUS AS NEEDED
Status: DISCONTINUED | OUTPATIENT
Start: 2024-11-15 | End: 2024-11-15

## 2024-11-15 RX ORDER — SODIUM CHLORIDE, SODIUM LACTATE, POTASSIUM CHLORIDE, CALCIUM CHLORIDE 600; 310; 30; 20 MG/100ML; MG/100ML; MG/100ML; MG/100ML
INJECTION, SOLUTION INTRAVENOUS CONTINUOUS PRN
Status: DISCONTINUED | OUTPATIENT
Start: 2024-11-15 | End: 2024-11-15

## 2024-11-15 RX ORDER — ACETAMINOPHEN 325 MG/1
975 TABLET ORAL ONCE
Status: COMPLETED | OUTPATIENT
Start: 2024-11-15 | End: 2024-11-15

## 2024-11-15 RX ORDER — OXYCODONE HYDROCHLORIDE 5 MG/1
5 TABLET ORAL EVERY 4 HOURS PRN
Qty: 6 TABLET | Refills: 0 | Status: SHIPPED | OUTPATIENT
Start: 2024-11-15 | End: 2024-11-18

## 2024-11-15 RX ORDER — OXYCODONE HYDROCHLORIDE 5 MG/1
5 TABLET ORAL EVERY 4 HOURS PRN
Refills: 0 | Status: CANCELLED | OUTPATIENT
Start: 2024-11-15

## 2024-11-15 RX ORDER — SODIUM CHLORIDE, SODIUM LACTATE, POTASSIUM CHLORIDE, CALCIUM CHLORIDE 600; 310; 30; 20 MG/100ML; MG/100ML; MG/100ML; MG/100ML
125 INJECTION, SOLUTION INTRAVENOUS CONTINUOUS
Status: CANCELLED | OUTPATIENT
Start: 2024-11-15

## 2024-11-15 RX ORDER — PROPOFOL 10 MG/ML
INJECTION, EMULSION INTRAVENOUS AS NEEDED
Status: DISCONTINUED | OUTPATIENT
Start: 2024-11-15 | End: 2024-11-15

## 2024-11-15 RX ORDER — IBUPROFEN 600 MG/1
600 TABLET, FILM COATED ORAL EVERY 8 HOURS PRN
Qty: 30 TABLET | Refills: 0 | Status: SHIPPED | OUTPATIENT
Start: 2024-11-15

## 2024-11-15 RX ORDER — FENTANYL CITRATE 50 UG/ML
INJECTION, SOLUTION INTRAMUSCULAR; INTRAVENOUS AS NEEDED
Status: DISCONTINUED | OUTPATIENT
Start: 2024-11-15 | End: 2024-11-15

## 2024-11-15 RX ORDER — BUPIVACAINE HYDROCHLORIDE 5 MG/ML
INJECTION, SOLUTION EPIDURAL; INTRACAUDAL
Status: COMPLETED | OUTPATIENT
Start: 2024-11-15 | End: 2024-11-15

## 2024-11-15 RX ORDER — MIDAZOLAM HYDROCHLORIDE 2 MG/2ML
INJECTION, SOLUTION INTRAMUSCULAR; INTRAVENOUS AS NEEDED
Status: DISCONTINUED | OUTPATIENT
Start: 2024-11-15 | End: 2024-11-15

## 2024-11-15 RX ORDER — ACETAMINOPHEN 500 MG
1000 TABLET ORAL EVERY 8 HOURS
Qty: 30 TABLET | Refills: 1 | Status: SHIPPED | OUTPATIENT
Start: 2024-11-15

## 2024-11-15 RX ORDER — MAGNESIUM HYDROXIDE 1200 MG/15ML
LIQUID ORAL AS NEEDED
Status: DISCONTINUED | OUTPATIENT
Start: 2024-11-15 | End: 2024-11-15 | Stop reason: HOSPADM

## 2024-11-15 RX ADMIN — LIDOCAINE HYDROCHLORIDE 50 MG: 10 INJECTION, SOLUTION EPIDURAL; INFILTRATION; INTRACAUDAL; PERINEURAL at 12:05

## 2024-11-15 RX ADMIN — FENTANYL CITRATE 25 MCG: 50 INJECTION INTRAMUSCULAR; INTRAVENOUS at 12:27

## 2024-11-15 RX ADMIN — LABETALOL HYDROCHLORIDE 5 MG: 5 INJECTION, SOLUTION INTRAVENOUS at 14:43

## 2024-11-15 RX ADMIN — FENTANYL CITRATE 25 MCG: 50 INJECTION INTRAMUSCULAR; INTRAVENOUS at 12:17

## 2024-11-15 RX ADMIN — ACETAMINOPHEN 325MG 975 MG: 325 TABLET ORAL at 10:52

## 2024-11-15 RX ADMIN — FENTANYL CITRATE 25 MCG: 50 INJECTION INTRAMUSCULAR; INTRAVENOUS at 12:35

## 2024-11-15 RX ADMIN — LABETALOL HYDROCHLORIDE 5 MG: 5 INJECTION, SOLUTION INTRAVENOUS at 14:23

## 2024-11-15 RX ADMIN — MIDAZOLAM 2 MG: 1 INJECTION INTRAMUSCULAR; INTRAVENOUS at 12:01

## 2024-11-15 RX ADMIN — FENTANYL CITRATE 25 MCG: 50 INJECTION INTRAMUSCULAR; INTRAVENOUS at 12:05

## 2024-11-15 RX ADMIN — BUPIVACAINE 15 ML: 13.3 INJECTION, SUSPENSION, LIPOSOMAL INFILTRATION at 11:00

## 2024-11-15 RX ADMIN — DEXAMETHASONE SODIUM PHOSPHATE 10 MG: 10 INJECTION, SOLUTION INTRAMUSCULAR; INTRAVENOUS at 12:05

## 2024-11-15 RX ADMIN — PROPOFOL 150 MG: 10 INJECTION, EMULSION INTRAVENOUS at 12:05

## 2024-11-15 RX ADMIN — CEFAZOLIN SODIUM 2000 MG: 2 SOLUTION INTRAVENOUS at 12:01

## 2024-11-15 RX ADMIN — CHLORHEXIDINE GLUCONATE 15 ML: 1.2 RINSE ORAL at 10:53

## 2024-11-15 RX ADMIN — ONDANSETRON 4 MG: 2 INJECTION INTRAMUSCULAR; INTRAVENOUS at 13:28

## 2024-11-15 RX ADMIN — SODIUM CHLORIDE, SODIUM LACTATE, POTASSIUM CHLORIDE, AND CALCIUM CHLORIDE: .6; .31; .03; .02 INJECTION, SOLUTION INTRAVENOUS at 11:35

## 2024-11-15 RX ADMIN — BUPIVACAINE HYDROCHLORIDE 15 ML: 5 INJECTION, SOLUTION EPIDURAL; INTRACAUDAL; PERINEURAL at 11:00

## 2024-11-15 NOTE — ANESTHESIA PROCEDURE NOTES
Peripheral Block    Patient location during procedure: holding area  Start time: 11/15/2024 11:00 AM  Reason for block: at surgeon's request and post-op pain management  Staffing  Performed by: Jared Celis MD  Authorized by: Jared Celis MD    Preanesthetic Checklist  Completed: patient identified, IV checked, site marked, risks and benefits discussed, surgical consent, monitors and equipment checked, pre-op evaluation and timeout performed  Peripheral Block  Patient position: supine  Prep: ChloraPrep  Patient monitoring: continuous pulse oximetry, frequent blood pressure checks and heart rate  Block type: Interscalene  Laterality: left  Injection technique: single-shot  Procedures: ultrasound guided, Ultrasound guidance required for the procedure to increase accuracy and safety of medication placement and decrease risk of complications.  Ultrasound permanent image saved  bupivacaine (PF) (MARCAINE) 0.5 % injection 20 mL - Perineural   15 mL - 11/15/2024 11:00:00 AM  bupivacaine liposomal (EXPAREL) 1.3 % injection 20 mL - Perineural   15 mL - 11/15/2024 11:00:00 AM  Needle  Needle type: Stimuplex   Needle gauge: 20 G  Needle length: 4 in  Needle localization: ultrasound guidance  Assessment  Injection assessment: frequent aspiration, injected with ease, negative aspiration, no paresthesia on injection, incremental injection, needle tip visualized at all times, negative for heart rate change and no symptoms of intraneural/intravenous injection  Paresthesia pain: immediately resolved  Post-procedure:  site cleaned  patient tolerated the procedure well with no immediate complications

## 2024-11-15 NOTE — H&P
ASSESSMENT/PLAN:    Assessment:   Left distal radius fracture.  The most recent imaging of the fracture demonstrates an unacceptable alignment with increased dorsal tilt, radial shortening, loss of radial inclination, and intra-articular extension. According to the AAOS distal radius fracture management guidelines, acceptable parameters are fractures with less than 3 mm of radial shortening, less than 10 degrees of dorsal tilt, and intra-articular displacement and step-off of less than 2 mm.  The physiology of a fractured bone was discussed with the patient today.  With displaced fractures, operative treatment often results in a functional recovery.  Typically, these fractures undergo reduction either through percutaneous or open methods depending on the location and fracture pattern.  Radiographs are typically taken at intervals throughout the fracture healing ensure maintenance of reduction and alignment.  If the fracture loses its alignment, revision surgery may be required.  Medical conditions such as diabetes, osteoporosis, vitamin D deficiency, and a history of or exposure to smoking may delay or prevent fracture healing.  The risks and benefits of the procedure were explained to the patient, which include, but are not limited to: Bleeding, infection, recurrence, pain, scar, malunion, nonunion, damage to tendons, damage to nerves, and damage to blood vessels, and complications related to anesthesia, failure to give desire result, need for more surgery.  These risks, along with alternative conservative treatment options, and postoperative protocols were voiced back and understood by the patient.  All questions were answered to the patient's satisfaction.  The patient agrees to comply with a standard postoperative protocol, and is willing to proceed.  We discussed wound care, incision and scar care, and general preoperative information was provided to the patient.  Prior to surgery, the patient may be requested  to stop all anti-inflammatory medications.  Prophylactic aspirin, Plavix, and Coumadin may be allowed to be continued.  Medications including vitamin E., ginkgo, and fish oil are requested to be stopped approximately one week prior to surgery.  Hypertensive medications and beta blockers, if taken, should be continued.    Plan:   I had a discussion with the patient regarding my clinical findings, diagnosis, and treatment plan.  All questions answered.    The patient is indicated for operative fixation  A splint was applied today to stabilize the fracture until surgery  Maintain the injured extremity nonweightbearing.  The splint should be maintained clean and dry.  Elevate the injured extremity to heart level for slightly above.  This will help with the swelling.  Tylenol and oral anti-inflammatories can be taken for pain. The patient was advised that NSAID-type medications have some very important potential side effects including: gastrointestinal irritation including hemorrhage and renal injuries.   Next Visit:  12-14 days after surgery        _____________________________________________________  CHIEF COMPLAINT:  Wrist pain      SUBJECTIVE:  Maricruz Alfaro is a 64 y.o. right hand dominant female presenting for evaluation of a left wrist injury.  The patient states the injury occurred on 10/27/2024 while walking down the stairs. The patient states that she fell down the stairs, struck her head on the wall and landed on the wrist. She was unsure of the position of her wrist. After injury she was evaluated by the ED on 10/28/2024. Images revealed a comminuted, displaced fracture. A reduction was reduced, but was unsuccessful. They were placed in a splint and referred for follow-up.  Since that time their pain has been moderately well-controlled.  They do not have numbness or tingling in the hand including no numbness or tingling in the median nerve distribution.  There is no pain in the elbow or shoulder.  She reports  "discomfort in the splint. The patient states that she is very physically active. She enjoys biking and hiking.     DOI: 10/27/2024    Occupation: Retired       PAST MEDICAL HISTORY:  Past Medical History:   Diagnosis Date    GERD (gastroesophageal reflux disease)     Hemorrhoids     Hypertension     Kidney stone     PONV (postoperative nausea and vomiting)        PAST SURGICAL HISTORY:  Past Surgical History:   Procedure Laterality Date    BREAST EXCISIONAL BIOPSY Left     benign; approx. 20 years ago     BREAST SURGERY      CYSTOSCOPY  06/03/2014    with removal of object    CYSTOSCOPY  04/06/2014    with resection of tumor    CYSTOSCOPY W/ URETERAL STENT PLACEMENT Right 04/06/2014    EXTRACORPOREAL SHOCK WAVE LITHOTRIPSY  05/21/2014    whole body    HEMORRHOID SURGERY      HYSTERECTOMY      age 47       FAMILY HISTORY:  Family History   Problem Relation Age of Onset    Breast cancer Mother 53    Hypertension Brother     Nephrolithiasis Brother     No Known Problems Father     No Known Problems Sister     No Known Problems Maternal Grandmother     No Known Problems Maternal Grandfather     No Known Problems Paternal Grandmother     No Known Problems Paternal Grandfather     Cancer Maternal Aunt         unknown origin     Cancer Maternal Aunt         unknown origin     Cancer Maternal Aunt         unknown origin     No Known Problems Sister     No Known Problems Sister        SOCIAL HISTORY:  Social History     Tobacco Use    Smoking status: Never    Smokeless tobacco: Never   Vaping Use    Vaping status: Never Used   Substance Use Topics    Alcohol use: Yes     Comment: 1-2 \"weak\" drinks 3-4 x week    Drug use: No       MEDICATIONS:  No current facility-administered medications for this encounter.    Current Outpatient Medications:     atorvastatin (LIPITOR) 10 mg tablet, TAKE 1 TABLET (10 MG TOTAL) BY MOUTH DAILY, Disp: 90 tablet, Rfl: 3    cephalexin (KEFLEX) 500 mg capsule, Take 1 capsule by mouth 2 (two) times " "a day, Disp: , Rfl:     fexofenadine (ALLEGRA) 180 MG tablet, Take 180 mg by mouth daily, Disp: , Rfl:     oxyCODONE (Roxicodone) 5 immediate release tablet, Take 1 tablet (5 mg total) by mouth every 4 (four) hours as needed for moderate pain Max Daily Amount: 30 mg, Disp: 5 tablet, Rfl: 0    pantoprazole (PROTONIX) 40 mg tablet, TAKE 1 TABLET BY MOUTH DAILY, Disp: 90 tablet, Rfl: 3    albuterol (Ventolin HFA) 90 mcg/act inhaler, Inhale 2 puffs every 6 (six) hours as needed for wheezing (Patient not taking: Reported on 11/7/2024), Disp: 1 Inhaler, Rfl: 5    traZODone (DESYREL) 50 mg tablet, Take 1 tablet (50 mg total) by mouth daily at bedtime as needed for sleep (Patient not taking: Reported on 11/7/2024), Disp: 90 tablet, Rfl: 0    ALLERGIES:  No Known Allergies    REVIEW OF SYSTEMS:  Pertinent items are noted in HPI.  A comprehensive review of systems was negative.    LABS:  HgA1c: No results found for: \"HGBA1C\"  BMP:   Lab Results   Component Value Date    GLUCOSE 98 04/06/2014    CALCIUM 9.9 08/12/2024     04/06/2014    K 4.1 08/12/2024    CO2 26 08/12/2024     08/12/2024    BUN 21 08/12/2024    CREATININE 0.64 08/12/2024         _____________________________________________________  PHYSICAL EXAMINATION:  Vital signs: There were no vitals taken for this visit.  General: well developed and well nourished, alert, oriented times 3, and appears comfortable  Psychiatric: Normal  HEENT: Trachea Midline, No torticollis  Cardiovascular: No discernable arrhythmia  Pulmonary: No wheezing or stridor  Abdomen: No rebound or guarding  Extremities: No peripheral edema  Skin: No masses, erythema, lacerations, fluctation, ulcerations  Neurovascular: Sensation Intact to the Median, Ulnar, Radial Nerve, Motor Intact to the Median, Ulnar, Radial Nerve, and Pulses Intact    MUSCULOSKELETAL EXAMINATION:  Left wrist  Skin is intact.  There is moderate swelling and ecchymosis about the wrist and diffusely into the hand.  " There is generalized tenderness over the distal radius and about the wrist.  There is no tenderness about the elbow or specifically about the radial head.  Patient has full sensation in all distributions including the median nerve distribution.    TTP:  Scaphoid tubercle: no   Anatomic snuff box: no  SL interval: no   Range of Motion:  Elbow: extension/flexion 0/140  Forearm: pronation/supination deferred  Wrist: Deferred  Digit: full AROM in DIP, PIP, MP joints  Motor Exam: firing AIN/PIN/U  Sensory Exam: Sensation intact to light touch in FDWS (radial), volar IF (median), volar SF (ulnar)  Vascular Exam: < 2 sec capillary refill     _____________________________________________________  STUDIES REVIEWED:  I reviewed imaging in PACS from 10/28/2024 of the left wrist which demonstrates an intra-articular distal radius fracture with excessive dorsal tilt, radial shortening, loss of radial inclination, and articular step-off greater than 2 mm      PROCEDURES PERFORMED:  Procedures     Scribe Attestation      I,:   am acting as a scribe while in the presence of the attending physician.:       I,:   personally performed the services described in this documentation    as scribed in my presence.:

## 2024-11-15 NOTE — DISCHARGE INSTR - AVS FIRST PAGE
POSTOPERATIVE INSTRUCTIONS - Dr. Chacho Wong (Orthopedic Surgery)    Follow-up Appointments  Please call to set up/confirm your first postoperative visit with Dr. Wong in 10-14 days    Dressing and Wound Care  A dressing has been placed on your hand/arm to keep the incisions clean.  Keep your dressing clean and dry.     Do not remove the surgical dressing/splint. It will be removed at your first postoperative office visit with the doctor or therapist.    Wear a plastic bag over your dressing/splint whenever you take a shower or bath until you are allowed to remove it  Swelling is normal after surgery.  Elevate your hand/arm so the surgical site is above your heart to decrease the swelling.  Swelling is like water, it runs downhill.  This is especially important for the first 72 hours after surgery.  The best way to elevate your hand/arm is with your fingers pointing towards the ceiling and your hand/arm above the level of the heart.  You can use pillows to help prop your hand/arm up when sitting or lying down.         If you are experiencing pain, be sure you are elevating your hand/arm as often as possible.  Apply an ice pack over your dressing/splint for 20 minutes of every hour for the first 3 days when you are awake. This can help to reduce swelling and inflammation. Be sure the ice pack is waterproof so it does not leak on the dressing/splint. A simple ice pack can be made by adding ten cubes and a small amount of water in a small zip-lock bag. Seal this small bag tightly. Place this small bag in a larger zip lock bag. Apply to the area in pain.  If the dressing feels too tight in spite of elevation, loosen the outer wrap but do not remove the entire dressing.  If you have exposed pins/wires, take clean gauze or a cotton tip applicator (like a Q-tip), get it wet in clean warm water mixed with non-scented hand soap (like Dove, Ivory, Dial, etc.), and gently wipe around the base of the pin where it comes through  the skin once a day. Do not use water from a well to clean as this has bacteria in it and can contribute to infections.     ACTIVITIES:  Bend and straighten the parts of your hand, wrist, elbow, and shoulder that are not included in your surgical dressing or splint. Do this at least 6 times a day, as this will help decrease swelling and speed up your recovery.  This includes your fingers. See the instructions listed below for finger motion. THIS IS VERY IMPORTANT FOR YOUR RECOVERY!        POSTOPERATIVE CARE/CONCERNS:  You may experience some temporary numbness in your fingers.  You should have very little to no bleeding on your dressing.  Notify the office (see contact info at bottom of page) for any of the following:  Excessive pain not relieved by rest, elevation, and pain medications  Feeling that the dressing is too tight in spite of adequately elevating hand/arm  Active bleeding through the dressing  Drainage from the wound site or pin sites  Foul odor from the dressing/wound  Temperature greater that 101? F or chills  Blue or excessively cold fingertips  Numbness of the fingertips that does not improve in spite of adequately elevating hand/arm    PAIN MEDICATION:   Pain is a normal part of the recovery after surgery. The pain medication provided to you will help to decrease the discomfort but will not completely eliminate the pain.      A prescription for a narcotic pain medicine (oxycodone or hydrocodone) and anti-inflammatory (naproxen) were called in to your pharmacy. Please take the anti-inflammatory medication AND over-the-counter acetaminophen (Tylenol) regularly.   In general tylenol can be taken as follows: 1000 mg every 8 hours, not to exceed 3000 mg in a 24-hour period  The instructions will be listed on the bottles. The narcotic pain medicine should be used for pain that is not controlled by these other medications and only for the first few days after surgery. The narcotic is HIGHLY ADDICTIVE and  has many side effects such as causing constipation, dizziness, confusion, decreased breathing and more. It is safe to take for a short period of time after surgery. It is almost never prescribed for longer than a few weeks and never after one month for elective surgeries.  Do not take narcotic or anti-inflammatories on an empty stomach.  It is illegal to drive while taking narcotic pain medication  Your pain should decrease over the first few days after surgery which will allow you to take less pain medicine, increase the time between doses of medication, or stop taking all pain medicine.

## 2024-11-15 NOTE — ANESTHESIA POSTPROCEDURE EVALUATION
Post-Op Assessment Note    CV Status:  Stable    Pain management: adequate       Mental Status:  Alert and awake   Hydration Status:  Euvolemic   PONV Controlled:  Controlled   Airway Patency:  Patent     Post Op Vitals Reviewed: Yes      Staff: CRNA           Last Filed PACU Vitals:  Vitals Value Taken Time   Temp 98.7    Pulse 61 11/15/24 1402   /87 11/15/24 1402   Resp 16 11/15/24 1402   SpO2 100 % 11/15/24 1402   Vitals shown include unfiled device data.    Modified Jose:  No data recorded

## 2024-11-15 NOTE — OP NOTE
OPERATIVE REPORT  PATIENT NAME: Maricruz Alfaro    :  1960  MRN: [id number]  Pt Location: [facility] OR ROOM 01    SURGERY DATE: 11/15/2024    Surgeons and Role:     * Chacho Wong MD - Primary        SHADY Rowell - Assist    Preop Diagnosis:  Other closed fracture of distal end of left radius, initial encounter [S52.326D]    Post-Op Diagnosis Codes:     * Other closed fracture of distal end of left radius, initial encounter [S52.294T]    Procedure(s):  Left - ORIF INTRA-ARTICULAR DISTAL RADIUS FRACTURE    Specimen(s):  * No specimens in log *    Estimated Blood Loss:   Minimal    Drains:  * No LDAs found *    Anesthesia Type:   Regional with Sedation    Operative Indications:  Other closed fracture of distal end of left radius, initial encounter [Z42.211H]  Patient is a healthy and active 64-year-old female presenting with a comminuted intra-articular left distal radius fracture.  She underwent attempted closed reduction in the emergency department when she followed up with me her fracture had about 30 degrees of dorsal angulation with significant shortening and intra-articular comminution.  I discussed with the patient that her age is borderline for what the [employer] Academy recommends for operative management of distal radius fractures.  Those over the age of 65 are typically managed nonoperatively for displaced fractures.  For those under the age of 65 we typically use 10 degrees of dorsal tilt, shortening of less than 3 mm and an intra-articular step-off or gap of less than 2 mm to dictate nonoperative management.  The patient expressed that she is very active enjoying biking and hiking and often works with her hands for many activities.  As such she feels that she should be considered is physiologically young and has those guidelines respected.  Based on these findings I would say that the patient has an unacceptable fracture alignment at this time and would benefit from an open reduction internal fixation.   As such she was indicated for this procedure and after careful discussion of risks and benefits she elected to proceed with surgery.    Operative Findings:  Fluoroscopic evaluation of the distal radius demonstrated dorsal comminution entering the articular surface as well as displaced intraarticular extension into the lunate facet and radial styloid a greater than 3 part articular distal radius fracture.         Procedure and Technique:  The patient was greeted in the preoperative area. The risks, benefits, and alternatives of the procedure were again discussed in detail with the patient and she elected to proceed. The operative extremity was marked.  A preoperative referral nerve block was administered by anesthesia.  The patient was brought to the operating room and placed under anesthesia. A tourniquet was applied. The operative extremity was prepped and draped in the usual sterile fashion. A timeout was performed identifying the name and laterality of the procedure. The limb was exsanguinated and the tourniquet was inflated.      A 5 cm incision was carried out over the FCR tendon. The sheath of the FCR was incised and the tendon was retracted ulnarly. The radial artery was retracted radially. The FCR subsheath was then sharply incised taking care to avoid the palmar cutaneous nerve. The FPL was retracted ulnarly. The pronator quadratus was identified and incised along its radial and distal border, and was elevated along with the volar periosteum of the distal radius. The fracture site was identified. The primary fracture line was transverse and relatively distal.  There was a secondary fracture line approximately creating a free floating volar cortical piece. There was also displaced intraarticular extension involving the lunate facet, and a separate displaced radial styloid fragment.  There was a relatively significant amount of fracture callus that had formed at this point.  A Dickens was used to initially  define all the fracture lines and then a curette and a rongeur were used to remove the interposing fracture callus.     Manual reduction of the fracture was attempted.  Although the lunate facet could be reduced the radial column remained short and .  As such the brachial radialis was dissected out and a step cut and the brachial radialis was performed.  A manual reduction was then again attempted and although the radial column demonstrated improved translation, it was still not perfectly reduced.  As such the periosteum from the dorsal aspect of the proximal radial shaft was elevated as the shaft was pronated out of the wound.  The dorsal periosteum was then sharply incised transversely to further free the radial column.  At this point a manual reduction was attempted and it became apparent that a appropriate reduction could be achieved in this manner. A standard 3 hole plate was selected. The plate was placed over the distal radius and positioned under fluoroscopic guidance.  The plate appeared oversized and as such a narrow plate was selected.  The narrow plate was then placed over the volar cortex and translated this for ulnar as the bone would allow.  This was specifically done in an effort to catch the volar ulnar corner of the fracture fragment. A 16 mm 3.5 cortical shaft screw was placed in the oblong hole.  The distal fragment was then reduced to the plate and K wires were placed in the distal and proximal plate to maintain plate orientation and help provisionally fix the fracture.  At this point two 24 mm nonlocking screws were placed in the radial and most ulnar holes of the distal radius plate.  These were used to further reduce the distal fragment down to the plate.  Once these were placed the distal row remaining screws were filled with fully threaded locking screws.  At this point the shaft screw was loosened and the [employer] fracture reduction tool was used to gain length and ulnar translation  of the fracture fragment.  Once this was found to be of acceptable alignment on fluoroscopy, the remaining shaft screws were drilled and filled with locking screws.  The initial 16mm shaft screw was then replaced with an appropriate length nonlocking screw.  Attention was then turned back to the distal plate where the remaining holes aside from the most proximal and radial were filled with locking pegs.  The initial 2 nonlocking screws in the distal row were then replaced with appropriate length fully threaded locking screw.       Final fluoroscopic views confirmed fracture reduction and implant placement. The tourniquet was released and hemostasis achieved. The wound was closed with 4-0 Vicryl sutures in the subcutaneous tissues and interrupted vertical mattress 3-0 nylon sutures. Sterile dressings and a volar splint were applied. The patient was awoken and transported to the recovery room in stable condition.         I was present for the entire procedure. A qualified resident physician was not available and a physician assistant was required during the procedure for retraction, tissue handling, dissection and suturing.     Tourniquet time: 69 minutes, 250 mmHg     Complications:   None        Patient Disposition:  PACU      Plan:  Keep dressing clean and dry until follow up  Sutures to be removed at 2 weeks.  Non weight bearing left upper extremity  Follow up in office in 2 weeks              SIGNATURE: Chacho Wong MD  DATE: November 15, 2024  TIME: 3:08 PM

## 2024-11-15 NOTE — INTERVAL H&P NOTE
H&P reviewed. After examining the patient I find no changes in the patients condition since the H&P had been written.    Vitals:    11/15/24 1032   BP: 155/89   Pulse: 71   Resp: 18   Temp: 98.5 °F (36.9 °C)   SpO2: 98%

## 2024-11-17 NOTE — ANESTHESIA POSTPROCEDURE EVALUATION
Post-Op Assessment Note            No anethesia notable event occurred.            Last Filed PACU Vitals:  Vitals Value Taken Time   Temp 98.6 °F (37 °C) 11/15/24 1518   Pulse 75 11/15/24 1518   /78 11/15/24 1518   Resp 20 11/15/24 1518   SpO2 96 % 11/15/24 1518       Modified Jose:  No data recorded

## 2024-11-18 ENCOUNTER — TELEPHONE (OUTPATIENT)
Age: 64
End: 2024-11-18

## 2024-11-18 NOTE — TELEPHONE ENCOUNTER
Caller: patient     Doctor: Marvin    Reason for call: patient still has numbness in her fingers, asking if this is normal?  Her fingers are cold but not blue. The numbness in her arm went away.  Sx was 11/15.    Call back#: [phone]

## 2024-11-18 NOTE — TELEPHONE ENCOUNTER
Called and spoke with pt. She states the nerve block has almost worn off on her arm but her fingers are still numb and she is concerned. She is able to wiggle them, has good cap refill, no discoloration and the dressing is not too tight. Advised that the nerve block just probably hasn't fully worn off yet but I would let Dr Wong know and if he had any concerns I would let her know. Please review, thanks!

## 2024-11-18 NOTE — TELEPHONE ENCOUNTER
I spoke with the patient on the phone. Her block is beginning to wear off and she still has some numbness in the hand and to a lesser extent the forearm. In the hand all the fingers are affected, although she states the thumb is the worst. I recommended that the patient loosen her dressing and maintain elevation. I will have her come in early tomorrow first thing to evaluate in person. I would expect the block to resolve by then which will help delineate her symptoms.

## 2024-11-18 NOTE — TELEPHONE ENCOUNTER
I called and gave patient a post-op appointment for tomorrow in the [place] location. Patient was grateful.  I did ask her to come in 15 minutes early, she expressed understanding.

## 2024-11-19 ENCOUNTER — OFFICE VISIT (OUTPATIENT)
Dept: OBGYN CLINIC | Facility: CLINIC | Age: 64
End: 2024-11-19

## 2024-11-19 VITALS
TEMPERATURE: 98.8 F | OXYGEN SATURATION: 97 % | SYSTOLIC BLOOD PRESSURE: 148 MMHG | HEIGHT: 66 IN | WEIGHT: 146 LBS | HEART RATE: 74 BPM | BODY MASS INDEX: 23.46 KG/M2 | DIASTOLIC BLOOD PRESSURE: 88 MMHG

## 2024-11-19 DIAGNOSIS — S52.592A OTHER CLOSED FRACTURE OF DISTAL END OF LEFT RADIUS, INITIAL ENCOUNTER: Primary | ICD-10-CM

## 2024-11-19 PROCEDURE — 99024 POSTOP FOLLOW-UP VISIT: CPT | Performed by: STUDENT IN AN ORGANIZED HEALTH CARE EDUCATION/TRAINING PROGRAM

## 2024-11-19 NOTE — PROGRESS NOTES
Assessment:   S/P Orif Intra-articular Distal Radius Fracture - Left on 11/15/2024. The patient has done well since surgery.  Yesterday when I spoke with the patient on the phone she did express some concern as she had some numbness in the hand.  However today this has largely resolved and she has minimal if any symptoms.  I did do a thorough examination with [place] Elie monofilament and 2-point discrimination testing and found no deficit in the hand.  As such I believe the symptoms you are feeling was the residual from the peripheral nerve block that she received which did have Exparel in it which is known to prolong the block symptoms.  Overall she has her normal expected postoperative course.  We did go over the x-rays today.  I did note to her that because of the comminution she had at the fracture site and the limited distal bone available for screw purchase I did have to place the plate slightly more distal than I typically like to for distal radius fracture.  While overall looks very good and I do not think it would be an issue for the volar tendons I did discuss with her the symptoms of having tendon irritation related to a prominent plate and did discuss that if this were to occur I would want to go in and take the plate out.  I would like to wait at least 3 months to do this though if this were to become an issue for her.  Currently she has no symptoms of this.  All questions were answered.    Plan:   Continue nonweightbearing  Keep dressing clean and dry  Elevate at rest  Tylenol Motrin for pain    Follow Up:  Follow-up at her regularly scheduled.  At appointment in 2 weeks      CHIEF COMPLAINT:  Post op follow up      SUBJECTIVE:  Maricruz Alfaro is a 64 y.o. female who presents for follow up after Orif Intra-articular Distal Radius Fracture - Left on 11/15/2024.  The patient has been doing well postoperatively however she did notice that yesterday on Monday she felt that her peripheral anesthesia  block had largely worn off but she was still having some numbness in the hand.  She called the office to discuss these findings.  At the time I noted that she was describing numbness throughout the hand including the small finger and that she still had some block active peripherally, however she did localize majority of her symptoms to the median nerve distribution.  Although this could have been attributed to her anesthesia block, and median nerve neuropraxia's are common after surgery, I did want to evaluate the patient in person.  As such I requested that she present today for an in person evaluation.  Since that time she says her symptoms have largely resolved and now she only has very minimal numbness in the thumb.  She has no pain.      PHYSICAL EXAMINATION:  Vital signs: There were no vitals taken for this visit.  General: well developed and well nourished, alert, oriented times 3, and appears comfortable  Psychiatric: Normal    MUSCULOSKELETAL EXAMINATION:  Incision: Postoperative dressing is clean dry  Range of Motion:  Deferred as she is in a splint.  She is wiggling her thumb and exposed fingers without issue or pain.  Neurovascular status: Grossly she has sensation intact in all exposed fingers.  I did a [place] Elie monofilament testing of her fingers and she had full sensation in the tips of all fingers including the thumb up to a size at 3.61 for the monofilament.  I then did 2 point discrimination testing and she had a 2 point discrimination of 5 mm for all fingers and the median and ulnar nerve distributions.      STUDIES REVIEWED:  No Studies to review

## 2024-12-04 ENCOUNTER — OFFICE VISIT (OUTPATIENT)
Dept: OBGYN CLINIC | Facility: CLINIC | Age: 64
End: 2024-12-04

## 2024-12-04 VITALS
HEIGHT: 66 IN | WEIGHT: 148 LBS | BODY MASS INDEX: 23.78 KG/M2 | DIASTOLIC BLOOD PRESSURE: 84 MMHG | TEMPERATURE: 99.5 F | OXYGEN SATURATION: 98 % | HEART RATE: 87 BPM | SYSTOLIC BLOOD PRESSURE: 167 MMHG | RESPIRATION RATE: 16 BRPM

## 2024-12-04 DIAGNOSIS — S52.592A OTHER CLOSED FRACTURE OF DISTAL END OF LEFT RADIUS, INITIAL ENCOUNTER: Primary | ICD-10-CM

## 2024-12-04 PROCEDURE — 99024 POSTOP FOLLOW-UP VISIT: CPT | Performed by: STUDENT IN AN ORGANIZED HEALTH CARE EDUCATION/TRAINING PROGRAM

## 2024-12-04 NOTE — PROGRESS NOTES
"Assessment:   S/P Orif Intra-articular Distal Radius Fracture - Left on 11/15/2024. The patient has done well since surgery. Her pain has been well controlled since surgery. I reviewed the intraoperative fluoroscopy with the patient today. Specifically I discussed that I placed the distal radius plate slightly more distally intentionally during surgery to improve the screw purchase in the distal fracture. This does increase the risk for flexor tendon irritation. The patient understands that if she develops irritation in the future we may need to remove the plate.  At this point the patient can start full active wrist range of motion and I in fact encourage it.  She will wear a removable wrist splint the remainder of the time to protect the wrist.  She is to remain nonweightbearing of the wrist for the next 4 weeks.  I did refer her to physical therapy so that she can be taught some range of motion exercises for this.  All questions answered.  Patient expressed understanding with treatment plan.    Plan:   Nonweightbearing for the next 4 weeks  Range of motion as tolerated  She should wear a removable wrist splint for 23 hours a day but should come out of it for range of motion exercises multiple times daily  Elevate wrist at rest  Tylenol and Motrin for pain    Follow Up:  Return to clinic in 4 weeks with new x-rays at that time      CHIEF COMPLAINT:  Post op follow up      SUBJECTIVE:  Maricruz Alfaro is a 64 y.o. female who presents for follow up after Orif Intra-articular Distal Radius Fracture - Left on 11/15/2024. Pain is well controlled on the current regimen. No issues with the post operative dressing. No fever or chills. No new numbness or tingling. Today patient has No Complaints.       PHYSICAL EXAMINATION:  Vital signs: /84 (BP Location: Left arm, Patient Position: Sitting, Cuff Size: Large)   Pulse 87   Temp 99.5 °F (37.5 °C) (Temporal)   Resp 16   Ht 5' 6\" (1.676 m)   Wt 67.1 kg (148 lb)   SpO2 " 98%   BMI 23.89 kg/m²   General: well developed and well nourished, alert, oriented times 3, and appears comfortable  Psychiatric: Normal    MUSCULOSKELETAL EXAMINATION:  Incision: Clean, dry, intact - sutures removed  Range of Motion: As expected and wrist flexion extension to 40 and 40  Neurovascular status: Neuro intact, good cap refill      STUDIES REVIEWED:  We reviewed the intraoperative fluoroscopy today and I discussed the improvement in fracture alignment and fracture fixation method with the patient

## 2025-01-13 ENCOUNTER — OFFICE VISIT (OUTPATIENT)
Dept: OBGYN CLINIC | Facility: CLINIC | Age: 65
End: 2025-01-13

## 2025-01-13 ENCOUNTER — APPOINTMENT (OUTPATIENT)
Dept: RADIOLOGY | Facility: MEDICAL CENTER | Age: 65
End: 2025-01-13
Payer: MEDICARE

## 2025-01-13 VITALS
HEIGHT: 66 IN | BODY MASS INDEX: 24.11 KG/M2 | HEART RATE: 79 BPM | OXYGEN SATURATION: 98 % | RESPIRATION RATE: 16 BRPM | WEIGHT: 150 LBS | TEMPERATURE: 98.4 F

## 2025-01-13 DIAGNOSIS — S52.592A OTHER CLOSED FRACTURE OF DISTAL END OF LEFT RADIUS, INITIAL ENCOUNTER: ICD-10-CM

## 2025-01-13 DIAGNOSIS — S52.592A OTHER CLOSED FRACTURE OF DISTAL END OF LEFT RADIUS, INITIAL ENCOUNTER: Primary | ICD-10-CM

## 2025-01-13 PROCEDURE — 99024 POSTOP FOLLOW-UP VISIT: CPT | Performed by: STUDENT IN AN ORGANIZED HEALTH CARE EDUCATION/TRAINING PROGRAM

## 2025-01-13 PROCEDURE — 73110 X-RAY EXAM OF WRIST: CPT

## 2025-02-19 ENCOUNTER — APPOINTMENT (OUTPATIENT)
Dept: RADIOLOGY | Facility: MEDICAL CENTER | Age: 65
End: 2025-02-19
Payer: MEDICARE

## 2025-02-19 ENCOUNTER — TELEPHONE (OUTPATIENT)
Age: 65
End: 2025-02-19

## 2025-02-19 ENCOUNTER — OFFICE VISIT (OUTPATIENT)
Dept: OBGYN CLINIC | Facility: CLINIC | Age: 65
End: 2025-02-19

## 2025-02-19 VITALS
WEIGHT: 149.8 LBS | BODY MASS INDEX: 24.08 KG/M2 | OXYGEN SATURATION: 97 % | TEMPERATURE: 98.3 F | RESPIRATION RATE: 16 BRPM | HEART RATE: 88 BPM | HEIGHT: 66 IN

## 2025-02-19 DIAGNOSIS — M79.673 PAIN OF FOOT, UNSPECIFIED LATERALITY: ICD-10-CM

## 2025-02-19 DIAGNOSIS — S52.592A OTHER CLOSED FRACTURE OF DISTAL END OF LEFT RADIUS, INITIAL ENCOUNTER: Primary | ICD-10-CM

## 2025-02-19 PROCEDURE — 99024 POSTOP FOLLOW-UP VISIT: CPT | Performed by: STUDENT IN AN ORGANIZED HEALTH CARE EDUCATION/TRAINING PROGRAM

## 2025-02-19 PROCEDURE — 73110 X-RAY EXAM OF WRIST: CPT

## 2025-02-19 NOTE — PROGRESS NOTES
Assessment and Plan:  1. Other closed fracture of distal end of left radius, initial encounter  XR wrist 3+ vw left          64 y.o. female presents 3 months status post ORIF left distal radius fracture.  She has no pain about the wrist.  She has good range of motion on examination.  There is no irritation of the flexor tendons and no crepitus over the volar wrist.  Overall she is very happy with her care.    Xrays of left wrist were obtained and reviewed today.   There are no precautions at this time.   Discussed that numbness in the left palm is likely related to a neuropraxia and should improve with time.   Patient may follow-up as needed for any new or worsening symptoms.   She will follow-up as needed    she expressed understanding of the plan and agreed. We encouraged them to contact our office with any questions or concerns.       Chief Complaint:     Post op follow-up    Surgery:  ORIF left distal radius - 11/15/2024    History of Present Illness:   Patient presents now 3 months status post the above surgery. Patient reports she continues to have numbness in the palm of her hand. However, numbness is improving since last visit. Patient denies any pain in the left wrist.  Overall she notices no deficit aside from a dime size area of numbness directly in the center over the palm of her hand    Past Medical History:  Past Medical History:   Diagnosis Date    GERD (gastroesophageal reflux disease)     Hemorrhoids     Hypertension     Kidney stone     PONV (postoperative nausea and vomiting)      Past Surgical History:   Procedure Laterality Date    BREAST EXCISIONAL BIOPSY Left     benign; approx. 20 years ago     BREAST SURGERY      CYSTOSCOPY  06/03/2014    with removal of object    CYSTOSCOPY  04/06/2014    with resection of tumor    CYSTOSCOPY W/ URETERAL STENT PLACEMENT Right 04/06/2014    EXTRACORPOREAL SHOCK WAVE LITHOTRIPSY  05/21/2014    whole body    HEMORRHOID SURGERY      HYSTERECTOMY      age 47     "ORIF WRIST FRACTURE Left 11/15/2024    Procedure: ORIF INTRA-ARTICULAR DISTAL RADIUS FRACTURE;  Surgeon: Chacho Wong MD;  Location:  MAIN OR;  Service: Orthopedics     Family History   Problem Relation Age of Onset    Breast cancer Mother 53    Hypertension Brother     Nephrolithiasis Brother     No Known Problems Father     No Known Problems Sister     No Known Problems Maternal Grandmother     No Known Problems Maternal Grandfather     No Known Problems Paternal Grandmother     No Known Problems Paternal Grandfather     Cancer Maternal Aunt         unknown origin     Cancer Maternal Aunt         unknown origin     Cancer Maternal Aunt         unknown origin     No Known Problems Sister     No Known Problems Sister      Social History     Socioeconomic History    Marital status: Unknown     Spouse name: Not on file    Number of children: Not on file    Years of education: Not on file    Highest education level: Not on file   Occupational History    Not on file   Tobacco Use    Smoking status: Never    Smokeless tobacco: Never   Vaping Use    Vaping status: Never Used   Substance and Sexual Activity    Alcohol use: Yes     Comment: 1-2 \"weak\" drinks 3-4 x week    Drug use: No    Sexual activity: Not Currently   Other Topics Concern    Not on file   Social History Narrative    Not on file     Social Drivers of Health     Financial Resource Strain: Low Risk  (8/21/2024)    Received from [facility]    Overall Financial Resource Strain (CARDIA)     Difficulty of Paying Living Expenses: Not very hard   Food Insecurity: Unknown (8/21/2024)    Received from [facility]    Hunger Vital Sign     Worried About Running Out of Food in the Last Year: Not on file     Ran Out of Food in the Last Year: Never true   Transportation Needs: Unknown (8/21/2024)    Received from [facility]    PRAPARE - Transportation     Lack of Transportation (Medical): No     Lack of " "Transportation (Non-Medical): Not on file   Physical Activity: Not on file   Stress: Stress Concern Present (8/21/2024)    Received from [facility]    [place] of Occupational [employer] - Occupational Stress Questionnaire     Feeling of Stress : To some extent   Social Connections: Feeling Socially Integrated (8/21/2024)    Received from [facility]    OASIS : Social Isolation     How often do you feel lonely or isolated from those around you?: Rarely   Intimate Partner Violence: Unknown (8/21/2024)    Received from [facility], [facility]    Humiliation, Afraid, Rape, and Kick questionnaire     Fear of Current or Ex-Partner: No     Emotionally Abused: No     Physically Abused: Not on file     Sexually Abused: No   Housing Stability: Low Risk  (8/21/2024)    Received from [facility]    Housing Stability Vital Sign     Unable to Pay for Housing in the Last Year: No     Number of Times Moved in the Last Year: 0     Homeless in the Last Year: No     Scheduled Meds:  Continuous Infusions:No current facility-administered medications for this visit.    PRN Meds:.  No Known Allergies      PHYSICAL EXAMINATION:    Pulse 88   Temp 98.3 °F (36.8 °C) (Temporal)   Resp 16   Ht 5' 6\" (1.676 m)   Wt 67.9 kg (149 lb 12.8 oz)   SpO2 97%   BMI 24.18 kg/m²     Gen: A&Ox3, NAD    Left Upper Extremity:  Incision is well healed without signs of acute infection.   No tenderness about the wrist or distal radius  No crepitus with flexion/extension of thumb   Sensation intact to light touch in the axillary median, ulnar, and radial nerve distributions  70 degrees flexion and 45 degrees of extension of wrist  Supination and pronation intact  NV intact      STUDIES REVIEWED:  Xrays of left wrist were reviewed in PACS by Dr. Wong and demonstrate: hardware in stable alignment without signs of complication. Distal radius fracture is well " healed.         Chacho Wong MD  Hand and Upper Extremity Surgery          *This note was dictated using Dragon voice recognition software. Please excuse any word substitutions or errors.*    Scribe Attestation      I,:  Laquita Montana PA-C am acting as a scribe while in the presence of the attending physician.:       I,:  Chacho Wong MD personally performed the services described in this documentation    as scribed in my presence.:

## 2025-03-12 ENCOUNTER — HOSPITAL ENCOUNTER (OUTPATIENT)
Dept: RADIOLOGY | Facility: CLINIC | Age: 65
Discharge: HOME | End: 2025-03-12
Payer: MEDICARE

## 2025-03-12 ENCOUNTER — OFFICE VISIT (OUTPATIENT)
Dept: PODIATRY | Age: 65
End: 2025-03-12
Payer: MEDICARE

## 2025-03-12 VITALS — HEIGHT: 66 IN | BODY MASS INDEX: 23.82 KG/M2 | WEIGHT: 148.2 LBS

## 2025-03-12 DIAGNOSIS — M79.672 LEFT FOOT PAIN: ICD-10-CM

## 2025-03-12 DIAGNOSIS — M76.62 ACHILLES TENDINITIS OF LEFT LOWER EXTREMITY: ICD-10-CM

## 2025-03-12 DIAGNOSIS — M79.672 LEFT FOOT PAIN: Primary | ICD-10-CM

## 2025-03-12 PROCEDURE — 73630 X-RAY EXAM OF FOOT: CPT

## 2025-03-12 PROCEDURE — 99203 OFFICE O/P NEW LOW 30 MIN: CPT | Performed by: STUDENT IN AN ORGANIZED HEALTH CARE EDUCATION/TRAINING PROGRAM

## 2025-03-12 NOTE — PROGRESS NOTES
Name: Maricruz Alfaro      : 1960      MRN: [id number]  Encounter Provider: Lata Delaney DPM  Encounter Date: 3/12/2025   Encounter department: [facility] [place]  :  Assessment & Plan  Left foot pain    Orders:    XR foot 3+ vw left; Future    Ambulatory referral to Physical Therapy; Future    Achilles tendinitis of left lower extremity    Orders:    Ambulatory Referral to Podiatry    Ambulatory referral to Physical Therapy; Future    Imaging Reviewed at this visit (I personally reviewed/independently interpreted images and reports in PACS)  XR left foot WB 3v 3/12/25: No acute osseous abnormalities noted. Elevated and elongated 1st metatarsal. Plantar and posterior calcaneal spurs.        PLAN:  I reviewed clinical exam and radiographic imaging (XR) with patient in detail today. I have discussed with the patient the pathophysiology of this diagnosis and reviewed how the examination correlates with this diagnosis.  I explained at length the biomechanical abnormalities leading to the significant overload of the achilles tendon insertion  Instructions were given for conservative care which was also demonstrated during the clinical visit. I stressed the importance of achilles tendon stretching, icing and applying voltarin gel or biofreeze (OTC) at least 3x/day.    I recommend stiff bottomed sneakers/shoes (ex Asics, Vionic, New balance, Suarez, etc) for daily use and Nathalie Briones (recovery slides) for in home use.   I advised pt to obtain OTC Vasyli-Dananberg arch supports  I ordered the addition of PT at this visit to aide in reduction of equinus and also address the achilles insertion and gastroc aponeurosis with graston technique.   RTC 3 months      History of Present Illness   HPI  Maricruz Alfaro is a 64 y.o. female who presents to clinic concerning left posterior heel pain. Present for 8 months. Uneven surfaces hurt worse. Pain about daily. Lots of stiffness. Some tingling, worse when  "she first wakes up. No back pain. No h/o trauma to area. She is an avid biker and hiker.       Review of Systems   Constitutional:  Negative for activity change, chills and fever.   HENT: Negative.     Respiratory:  Negative for cough, chest tightness and shortness of breath.    Cardiovascular:  Negative for chest pain and leg swelling.   Endocrine: Negative.    Genitourinary: Negative.    Neurological: Negative.  Negative for numbness.   Psychiatric/Behavioral: Negative.  Negative for agitation and behavioral problems.           Objective   Ht 5' 6\" (1.676 m)   Wt 67.2 kg (148 lb 3.2 oz)   BMI 23.92 kg/m²      Physical Exam  Constitutional:       General: She is not in acute distress.     Appearance: Normal appearance. She is not ill-appearing.   Cardiovascular:      Pulses: Normal pulses.   Pulmonary:      Effort: Pulmonary effort is normal.   Musculoskeletal:         General: Tenderness (Left posterior heel at insertion of achilles to calc) and deformity (Hypertrophy of left posterior achilles at insertion) present.      Comments: MMT 5/5 in all planes. Toe and ankle ROM intact and without pain.   Gastroc-soleal equinus.    Skin:     General: Skin is warm and dry.      Findings: No erythema or lesion.   Neurological:      General: No focal deficit present.      Mental Status: She is alert and oriented to person, place, and time.      Sensory: No sensory deficit.           "

## 2025-03-12 NOTE — PATIENT INSTRUCTIONS
Foot Pain Home Therapy    Stretch. Place painful foot in back with heel on ground and lean against wall. Do not lift heel off of ground. You will feel the stretch in the calf muscles and possibly the heel. Hold the stretch for 20-30 seconds. Do this at least 5-6 times per day. It is best done after exercise when your muscles are warm.  Wear supportive shoes at all times. Avoid flip-flops, flat sandals, barefoot walking (never walk barefoot, even at home). Generally avoid shoes that are too flexible and bend in the arch. Your shoes should only slightly bend in the toe area, not the middle. Running sneakers are often the best choice. Soft or no back.   Supportive sneaker brands: Suarez, On Cloud, Hoka, Altra, New Balance, Asics, Mizuno  [employer] (discount if mention Dr mejia)  [facility]et Feet [facility] [place]   [employer] [place]  Supportive daily shoe brands: Vionic, Orthofeet, Garima, Dansko, Chacos, Villela, Teva, Birkenstock  Supportive home shoes: Nathalie Briones (recovery slides)  Achilles tendon sleeve, silicone   Purchase over the counter topical pain creams such as Voltarin gel, biofreeze, or CBD cream - will need to apply 2-3 times per day for benefit. + deep tissue massage.   Look in to over the counter shoe inserts/arch supports such as Vasyli-Dananberg (remove tabs under 1st toe) arch supports. These are all available on Amazon.com as well as their individual website's.   [employer]: Vasyli+Dananberg 1st Ray Orthotic, Medium, 1st Ray Function, Medium Density, Full-Length Insole, Heat Molding Optional, Best All Around Orthotic, Functional Biomechanical Control for Pain Relief, Black Yellow (66406) : Health & Household   VASYLI + Dananberg -- Vasyli [employer]          Achilles Tendon Stretching Exercises    A) Standing Gastrocnemius stretch  Place hands on wall or chair. If using wall, put your hands at eye level.  Step the leg you want to stretch behind you. Keep your back heel  on the floor and point your toes straight ahead or slightly inward towards the heel of the opposite foot.   Bend your knee toward the wall while keeping your back leg straight.  Lean toward the wall until you feel a gentle stretch in you calf of the straight leg. Don't lean so far that you feel pain.  Hold for 15 seconds. Complete 3 reps.    B) Standing soleus stretch  Place your hands on the wall or chair. If using wall, put your hands at eye level.  Step the leg you want to stretch behind you (your back foot will need to be closer to the front foot than the above stretch). Keep your back heel on the floor and point your toes straight ahead or slightly inward towards the heel of the opposite foot.   Bend both your front and back knee at the same time (may help to stick your butt out). You do not need to lean towards the wall, just bend the knees.   Lean toward the wall until you feel a gentle stretch in you calf of the straight leg. Don't lean so far that you feel pain.  Hold for 15 seconds. Complete 3 reps.          Keep these tips and tricks in mind to get the most out of your stretching;  Take your time - move slowly, whether you are deepening into a stretch or changing positions. This will limit the risk of injury & discomfort.  Avoid bouncing - quick sudden movements will only worsen achilles tendon issues. Stay relaxed during stretch.  Keep your heel down and toes straight ahead or slightly inward - this will allow the achilles tendon to stretch properly.   Stop if you feel pain - Don't strain or force your muscle. If you feel sharp pain, stop immediately.

## 2025-03-20 ENCOUNTER — TELEPHONE (OUTPATIENT)
Age: 65
End: 2025-03-20

## 2025-03-20 NOTE — TELEPHONE ENCOUNTER
Caller: Maricruz Alfaro    Doctor and/or Office: Dr. Delaney/Cate    #: [phone]    Escalation: Care/Is very confused about what she is to get as far as shoes and the OTC arch support. She said the arch supports come in a pair and she only needs one. Please call back and explain what she is to order. Thanks

## 2025-03-26 ENCOUNTER — EVALUATION (OUTPATIENT)
Dept: PHYSICAL THERAPY | Facility: HOME HEALTHCARE | Age: 65
End: 2025-03-26
Payer: MEDICARE

## 2025-03-26 DIAGNOSIS — M79.672 LEFT FOOT PAIN: Primary | ICD-10-CM

## 2025-03-26 DIAGNOSIS — M76.62 ACHILLES TENDINITIS OF LEFT LOWER EXTREMITY: ICD-10-CM

## 2025-03-26 PROCEDURE — 97110 THERAPEUTIC EXERCISES: CPT

## 2025-03-26 PROCEDURE — 97161 PT EVAL LOW COMPLEX 20 MIN: CPT

## 2025-03-26 NOTE — PROGRESS NOTES
PT Evaluation     Today's date: 3/26/2025  Patient name: Maricruz Alfaro  : 1960  MRN: [id number]  Referring provider: Lata Delaney DPM  Dx:   Encounter Diagnosis     ICD-10-CM    1. Left foot pain  M79.672 Ambulatory referral to Physical Therapy      2. Achilles tendinitis of left lower extremity  M76.62 Ambulatory referral to Physical Therapy          Start Time: 925  Stop Time: 100  Total time in clinic (min): 40 minutes    Assessment  Impairments: abnormal or restricted ROM, activity intolerance, lacks appropriate home exercise program, pain with function and activity limitations    Assessment details: Maricruz Alfaro is a 64 y.o. female presenting to OP PT clinic in [place] w/ referral for L foot pain, L achilles tendonitis.  Pt presents w/ L foot abnormalities compared to R foot including forefoot varus causing high arch of L foot, elevated 1st metatarsal of L foot, & bone spurs on L calcaneus at distal achilles.  Pt w/ decreased P/AROM in L ankle motion, pain w/ initial ambulation from seated position, stair climbing, and pain w/ function.  Pt has difficulty performing ADLs and recreational activities due to above mentioned deficits.  PT discussed potential shoe change w/ appropriate insoles providing arch support, PT supplied pt w/ recommendation for shoe store that focuses on running shoes.  Pt would benefit from skilled therapy to address impairments, allowing pt to perform ADLs and recreational activities w/o restriction or pain to improve QoL and return to PLOF.  PT gave pt HEP focusing on performing exercises/ activities outside of the clinic to further improve and address impairments.  Thank you for this referral!      Understanding of Dx/Px/POC: good     Prognosis: good    Goals  STG:  -Pt will improve pain from 7/10 to 4/10 at worst to allow reduced difficulty performing ADLs due to pain in 4 weeks.  -Pt will increase L ankle DF AROM from 12* to >15* to allow pt w/ improved ability to  ascend/descend stairs w/ less difficulty in 4 weeks.    LTG:  -Pt will be d/c from OP PT w/ I HEP to allow pt to continue improving upon their impairments for improved ADLs/ recreational activities in 12 weeks.  -Pt will improve initial walking pain from intermittent to abolished to allow improved ability to perform ADLs/ recreational activities w/o need for a rest break in 12 weeks.  -Pt will improve stair negotiation from non-reciprocal gait pattern to reciprocal gait pattern to show increased independence to return to PLOF in 12 weeks.     Plan  Patient would benefit from: skilled physical therapy  Planned modality interventions: cryotherapy, neuromuscular electric stimulation and TENS    Planned therapy interventions: joint mobilization, manual therapy, massage, neuromuscular re-education, strengthening, stretching, therapeutic activities, therapeutic exercise, home exercise program, gait training, flexibility, functional ROM exercises, balance/weight bearing training and balance    Frequency: 2x week  Duration in weeks: 12  Plan of Care beginning date: 3/26/2025  Plan of Care expiration date: 6/18/2025  Treatment plan discussed with: patient  Plan details: Begin POC focusing on addressing & improving impairments listed in eval.        Subjective Evaluation    History of Present Illness  Date of onset: 8/1/2024  Mechanism of injury: Pt states that L foot pain has been going on for roughly 8 months.  Has flor[place] steven posterior, medial heel traveling into medial midfoot.  Pt has most difficulty due to pain in L foot when performing initial gait from after a seated position and waking up in the morning.  Pt regularly walks and has no difficulty until a rest period.  Pt primarily wears flip-flops and footwear w/o high arch insoles, especially during the summer.  Pt wants to return to recreational biking and ambulation w/o pain.  Pt saw Dr. Delaney w/ Xrays showing 1st MT elevation and calcaneal spurs.  Tylenol  PRN  Patient Goals  Patient goals for therapy: increased strength, decreased pain, increased motion, independence with ADLs/IADLs and return to sport/leisure activities    Pain  Current pain ratin  At best pain ratin  At worst pain ratin  Location: L heel/ foot  Quality: needle-like and throbbing  Relieving factors: relaxation, rest, change in position and medications  Progression: improved    Social Support  Steps to enter house: yes  Stairs in house: yes     Employment status: not working    Diagnostic Tests  X-ray: abnormal  Treatments  Current treatment: physical therapy        Objective     Tenderness   Left Ankle/Foot   Tenderness in the Achilles insertion, first metatarsal head and plantar fascia.     Neurological Testing     Sensation     Ankle/Foot   Left Ankle/Foot   Paresthesia: light touch    Right Ankle/Foot   Intact: light touch     Active Range of Motion   Left Ankle/Foot   Dorsiflexion (ke): 11 degrees   Plantar flexion: 28 degrees   Inversion: 20 degrees   Eversion: 18 degrees   Great toe flexion: WFL  Great toe extension: WFL    Right Ankle/Foot   Dorsiflexion (ke): 15 degrees   Plantar flexion: 28 degrees   Inversion: WFL  Eversion: WFL    Passive Range of Motion   Left Ankle/Foot    Dorsiflexion (ke): 15 degrees   Plantar flexion: 30 degrees   Inversion: 25 degrees   Eversion: 22 degrees     Right Ankle/Foot  Normal passive range of motion    Strength/Myotome Testing     Left Ankle/Foot   Normal strength    Right Ankle/Foot   Normal strength    Tests   Left Ankle/Foot   Positive for varus tilt and forefoot varus.     General Comments:      Ankle/Foot Comments   Pt w/ elevated 1st Metatarsal of L foot.    High arch of L foot    Posterior calcaneal spurs along distal achilles tendon             Precautions: n/a    Visit Count 1           Manuals 3/26        L medial foot massage         L  Ankle stretching (gastroc)                             Neuro Re-Ed          Romberg EO, EC                              Ther Ex         Nustep         3 way SLR         Standing Marches         Standing Step Stretch          HR/TR                   Supine Tband DF         Supine Tband PF         Seated wobble board         Towel inv/ever         Towel DF/PF         Toe squeezes         Ankle ABCs         Seated TR/HR         LAQ                                   Standing Runner's Stretch  HEP        Long sit Calf Stretch HEP                            Ther Activity                             Gait Training                             Modalities

## 2025-03-31 ENCOUNTER — OFFICE VISIT (OUTPATIENT)
Dept: PHYSICAL THERAPY | Facility: HOME HEALTHCARE | Age: 65
End: 2025-03-31
Payer: MEDICARE

## 2025-03-31 DIAGNOSIS — M79.672 LEFT FOOT PAIN: Primary | ICD-10-CM

## 2025-03-31 DIAGNOSIS — M76.62 ACHILLES TENDINITIS OF LEFT LOWER EXTREMITY: ICD-10-CM

## 2025-03-31 PROCEDURE — 97140 MANUAL THERAPY 1/> REGIONS: CPT

## 2025-03-31 PROCEDURE — 97110 THERAPEUTIC EXERCISES: CPT

## 2025-03-31 NOTE — PROGRESS NOTES
"Daily Note     Today's date: 3/31/2025  Patient name: Maricruz Alfaro  : 1960  MRN: [id number]  Referring provider: Lata Delaney DPM  Dx:   Encounter Diagnosis     ICD-10-CM    1. Left foot pain  M79.672       2. Achilles tendinitis of left lower extremity  M76.62                      Subjective: Pt reports she has tingling  in her L foot that comes and goes.       Objective: See treatment diary below      Assessment: Tolerated treatment well. Verbal cues needed for correct form with exercises. Pt with no c/o pain L foot t/o session or at end of session. Tightness noted L gastroc during stretching. Patient would benefit from continued PT      Plan: Continue per plan of care.      Precautions: n/a    Visit Count 1 2          Manuals 3/26 3/31       L medial foot massage  JK       L  Ankle stretching (gastroc)  JK                           Neuro Re-Ed          Jigneshberg EO, EC                             Ther Ex         Nustep  L1  10'        3 way SLR  1x10 ea Edgar        Standing Marches  1x10 Edgar        Standing Step Stretch          HR/TR  1x10 ea                  Supine Tband DF         Supine Tband PF         Seated wobble board  F/B  S/S  1x10 ea        Towel inv/ever  1x15 ea        Towel DF/PF         Toe squeezes  1x15        Ankle ABCs  X 1       Seated TR/HR         LAQ  3\" 1x15                                  Standing Runner's Stretch  HEP        Long sit Calf Stretch HEP                            Ther Activity                             Gait Training                             Modalities                                    "

## 2025-04-04 ENCOUNTER — OFFICE VISIT (OUTPATIENT)
Dept: PHYSICAL THERAPY | Facility: HOME HEALTHCARE | Age: 65
End: 2025-04-04
Payer: MEDICARE

## 2025-04-04 DIAGNOSIS — M79.672 LEFT FOOT PAIN: Primary | ICD-10-CM

## 2025-04-04 PROCEDURE — 97140 MANUAL THERAPY 1/> REGIONS: CPT

## 2025-04-04 PROCEDURE — 97110 THERAPEUTIC EXERCISES: CPT

## 2025-04-04 NOTE — PROGRESS NOTES
"Daily Note     Today's date: 2025  Patient name: Maricruz Alfaro  : 1960  MRN: [id number]  Referring provider: Lata Delaney DPM  Dx: No diagnosis found.    Start Time: 1040          Subjective: I felt ok after last visit.     Objective: See treatment diary below    Assessment: . Pt to session well and denied pain t/o tx. Patient would benefit from continued PT    Plan: Continue per plan of care.      Precautions: n/a    Visit Count 1 2 3         Manuals 3/26 3/31 4-4      L medial foot massage  JK EC      L  Ankle stretching (gastroc)  JK EC                          Neuro Re-Ed   4-4       Romberg EO, EC                             Ther Ex   4-4      Nustep  L1  10'  L1  10'      3 way SLR  1x10 ea Edgar  [id number]      Standing Marches  1x10 Edgar  1x15      Standing Step Stretch          HR/TR  1x10 ea  1x15                Supine Tband DF         Supine Tband PF         Seated wobble board  F/B  S/S  1x10 ea  F/S S/S  1x10 ea      Towel inv/ever  1x15 ea  1x15 ea       Towel DF/PF         Toe squeezes  1x15  1x15      Ankle ABCs  X 1 x1      Seated TR/HR         LAQ  3\" 1x15  3\" 1x15                                Standing Runner's Stretch  HEP        Long sit Calf Stretch HEP                            Ther Activity                             Gait Training                             Modalities                                      " Call placed to Dr. Mcclelland to update on potassium level of 2.7 and blood pressure, per MD give 40 mEq Potassium IV and 40 mEq PO, and recheck Potassium level in the morning and give Labetalol one time dose now.

## 2025-04-07 ENCOUNTER — OFFICE VISIT (OUTPATIENT)
Dept: PHYSICAL THERAPY | Facility: HOME HEALTHCARE | Age: 65
End: 2025-04-07
Payer: MEDICARE

## 2025-04-07 DIAGNOSIS — M76.62 ACHILLES TENDINITIS OF LEFT LOWER EXTREMITY: ICD-10-CM

## 2025-04-07 DIAGNOSIS — M79.672 LEFT FOOT PAIN: Primary | ICD-10-CM

## 2025-04-07 PROCEDURE — 97110 THERAPEUTIC EXERCISES: CPT

## 2025-04-07 PROCEDURE — 97140 MANUAL THERAPY 1/> REGIONS: CPT

## 2025-04-07 NOTE — PROGRESS NOTES
"Daily Note     Today's date: 2025  Patient name: Maricruz Alfaro  : 1960  MRN: [id number]  Referring provider: Lata Delaney DPM  Dx:   Encounter Diagnosis     ICD-10-CM    1. Left foot pain  M79.672       2. Achilles tendinitis of left lower extremity  M76.62                      Subjective: Pt reports she has a little pain in her L ankle.       Objective: See treatment diary below      Assessment: Tolerated treatment well. Progressed program today with good tolerance. Pt with no c/o increased pain L ankle/foot t/o session or at end of session. Patient would benefit from continued PT      Plan: Continue per plan of care.      Precautions: n/a    Visit Count 1 2 3 4        Manuals 3/26 3/31 4-4      L medial foot massage  JK EC ABELK     L  Ankle stretching (gastroc)  JK SHARIF ROMANK                         Neuro Re-Ed   4-4       Romberg EO, EC    Firm EO/EC  30\"x 1 ea                          Ther Ex   4-4      Nustep  L1  10'  L1  10' L1 10'     3 way SLR  1x10 ea Edgar  [id number] 1x20 ea Edgar      Standing Marches  1x10 Edgar  1x15 1x20     Standing Step Stretch          HR/TR  1x10 ea  1x15 1x20 ea                Supine Tband DF    Yellow 1x10     Supine Tband PF    Yellow   1x10      Seated wobble board  F/B  S/S  1x10 ea  F/S S/S  1x10 ea F/B  S/S  1x10 ea      Towel inv/ever  1x15 ea  1x15 ea  1x20 ea      Towel DF/PF         Toe squeezes  1x15  1x15 1x20     Ankle ABCs  X 1 x1 X 1      Seated TR/HR         LAQ  3\" 1x15  3\" 1x15 3\" 1x15                                Standing Runner's Stretch  HEP   20\"x 3      Long sit Calf Stretch HEP                            Ther Activity                             Gait Training                             Modalities                                        "

## 2025-04-09 ENCOUNTER — OFFICE VISIT (OUTPATIENT)
Dept: PHYSICAL THERAPY | Facility: HOME HEALTHCARE | Age: 65
End: 2025-04-09
Payer: MEDICARE

## 2025-04-09 DIAGNOSIS — M79.672 LEFT FOOT PAIN: Primary | ICD-10-CM

## 2025-04-09 DIAGNOSIS — M76.62 ACHILLES TENDINITIS OF LEFT LOWER EXTREMITY: ICD-10-CM

## 2025-04-09 PROCEDURE — 97140 MANUAL THERAPY 1/> REGIONS: CPT

## 2025-04-09 PROCEDURE — 97110 THERAPEUTIC EXERCISES: CPT

## 2025-04-09 NOTE — PROGRESS NOTES
"Daily Note     Today's date: 2025  Patient name: Maricruz Alfaro  : 1960  MRN: [id number]  Referring provider: Lata Delaney DPM  Dx: No diagnosis found.    Start Time: 913          Subjective: I don't have pain right now.    Objective: See treatment diary below    Assessment: Pt sarah session very well and was able to complete all TE and sarah MT without c/o L heel pain. Tenderness reported at posterior and medial heel regions with pressure during MT.  Patient would benefit from continued PT    Plan: Continue per plan of care.      Precautions: n/a    Visit Count 1 2 3 4 5       Manuals 3/26 3/31 4--   L medial foot massage  JK EC JK  EC   L  Ankle stretching (gastroc)  JK EC JK  EC                       Neuro Re-Ed       Romberg EO, EC    Firm EO/EC  30\"x 1 ea   Firm EO/EC  30\" x1 ea                       Ther Ex      Nustep  L1  10'  L1  10' L1 10'  L1  10'   3 way SLR  1x10 ea Dave  [id number] 1x20 ea Dave   1x20 ea dave   Standing Marches  1x10 Dave  1x15 1x20  x20   Standing Step Stretch          HR/TR  1x10 ea  1x15 1x20 ea   1x20 ea             Supine Tband DF    Yellow 1x10  Yellow  1x10   Supine Tband PF    Yellow   1x10   Yellow  1x10   Seated wobble board  F/B  S/S  1x10 ea  F/S S/S  1x10 ea F/B  S/S  1x10 ea   F/B  S/S  1x10 ea   Towel inv/ever  1x15 ea  1x15 ea  1x20 ea   1x20 ea   Towel DF/PF         Toe squeezes  1x15  1x15 1x20  1x20   Ankle ABCs  X 1 x1 X 1   x1   Seated TR/HR         LAQ  3\" 1x15  3\" 1x15 3\" 1x15   3\" x15                             Standing Runner's Stretch  HEP   20\"x 3   20\" x3   Long sit Calf Stretch HEP                            Ther Activity                             Gait Training                             Modalities                                          "

## 2025-04-14 ENCOUNTER — OFFICE VISIT (OUTPATIENT)
Dept: PHYSICAL THERAPY | Facility: HOME HEALTHCARE | Age: 65
End: 2025-04-14
Payer: MEDICARE

## 2025-04-14 DIAGNOSIS — M76.62 ACHILLES TENDINITIS OF LEFT LOWER EXTREMITY: ICD-10-CM

## 2025-04-14 DIAGNOSIS — M79.672 LEFT FOOT PAIN: Primary | ICD-10-CM

## 2025-04-14 PROCEDURE — 97110 THERAPEUTIC EXERCISES: CPT

## 2025-04-14 PROCEDURE — 97140 MANUAL THERAPY 1/> REGIONS: CPT

## 2025-04-14 NOTE — PROGRESS NOTES
"Daily Note     Today's date: 2025  Patient name: Maricruz Alfaro  : 1960  MRN: [id number]  Referring provider: Lata Delaney DPM  Dx:   Encounter Diagnosis     ICD-10-CM    1. Left foot pain  M79.672       2. Achilles tendinitis of left lower extremity  M76.62                      Subjective: Pt reports she has some pain in her L heel from walking yesterday. Pt reports she has been trying to walk more.       Objective: See treatment diary below      Assessment: Tolerated treatment well. Verbal cues needed for correct form with exercises. Progressed to tandem stance,  foam with romberg stance and increased reps with some exercises today. Unsteadiness noted during tandem stance and romberg stance on foam with eyes closed. Pt with no c/o increased pain in her L heel t/o session or at end of session. Patient would benefit from continued PT      Plan: Continue per plan of care.      Precautions: n/a    Visit Count 6 2 3 4 5       Manuals 4/14 3/31 4-4   4-9   L medial foot massage JK JK EC JK  EC   L  Ankle stretching (gastroc) JK JK EC JK  EC                       Neuro Re-Ed   4-4   4-9    Romberg EO, EC Foam EO/EC  30\"x1 ea  0 HHA   Firm EO/EC  30\"x 1 ea   Firm EO/EC  30\" x1 ea    tandem stancef Firm EO  25\"x1 ea R/L  0 HHA                  Ther Ex   -4   4-9   Nustep L1 10' L1  10'  L1  10' L1 10'  L1  10'   3 way SLR 1x20 ea Edgar  1x10 ea Edgar  [id number] 1x20 ea Edgar   1x20 ea edgar   Standing Marches X 20  1x10 Edgar  1x15 1x20  x20   Standing Step Stretch          HR/TR 1x20 ea 1x10 ea  1x15 1x20 ea   1x20 ea             Supine Tband DF Yellow   1x15   Yellow 1x10  Yellow  1x10   Supine Tband PF Yellow  1x15   Yellow   1x10   Yellow  1x10   Seated wobble board F/B  S/S  1x20 ea F/B  S/S  1x10 ea  F/S S/S  1x10 ea F/B  S/S  1x10 ea   F/B  S/S  1x10 ea   Towel inv/ever 1x30 ea  1x15 ea  1x15 ea  1x20 ea   1x20 ea   Towel DF/PF         Toe squeezes 1x30 1x15  1x15 1x20  1x20   Ankle ABCs X 2 X 1 x1 X 1   x1 " "  Seated TR/HR         LAQ 3\" 1x20 3\" 1x15  3\" 1x15 3\" 1x15   3\" x15                             Standing Runner's Stretch  20\"x 3    20\"x 3   20\" x3   Long sit Calf Stretch                             Ther Activity                             Gait Training                             Modalities                                            "

## 2025-04-17 ENCOUNTER — OFFICE VISIT (OUTPATIENT)
Dept: PHYSICAL THERAPY | Facility: HOME HEALTHCARE | Age: 65
End: 2025-04-17
Payer: MEDICARE

## 2025-04-17 DIAGNOSIS — M79.672 LEFT FOOT PAIN: Primary | ICD-10-CM

## 2025-04-17 DIAGNOSIS — M76.62 ACHILLES TENDINITIS OF LEFT LOWER EXTREMITY: ICD-10-CM

## 2025-04-17 PROCEDURE — 97110 THERAPEUTIC EXERCISES: CPT

## 2025-04-17 PROCEDURE — 97140 MANUAL THERAPY 1/> REGIONS: CPT

## 2025-04-17 NOTE — PROGRESS NOTES
"Daily Note     Today's date: 2025  Patient name: Maricruz Alfaro  : 1960  MRN: [id number]  Referring provider: Lata Delaney DPM  Dx: No diagnosis found.    Start Time: 1000          Subjective: I have no pain this morning so it must be getting better.     Objective: See treatment diary below    Assessment:  Pt sarah session very well with vc's provided as needed. Pt denied pain with all TE and MT. Pt with strength and ROM deficits evident. Pt reports consistency with HEP and was instructed in standing calf stretch on step. Patient would benefit from continued PT    Plan: Continue per plan of care.      Precautions: n/a    Visit Count 6 7 3 4 5       Manuals - 4-4   4-9   L medial foot massage JK EC EC JK  EC   L  Ankle stretching (gastroc) JK EC EC JK  EC                       Neuro Re-Ed   4-4   4-9    Romberg EO, EC Foam EO/EC  30\"x1 ea  0 HHA Foam EO/EC  30\" x1 ea  0HHA  Firm EO/EC  30\"x 1 ea   Firm EO/EC  30\" x1 ea    tandem stance Firm EO  25\"x1 ea R/L  0 HHA Firm EO  25\" x1 ea R/L        SLS  20\" x3  L only       Ther Ex   4-4   4-9   Nustep L1 10' L2  10'  L1  10' L1 10'  L1  10'   3 way SLR 1x20 ea Dave  1x20 ea Dave  [id number] 1x20 ea Dave   1x20 ea dave   Standing Marches X 20  1x20 Dave  1x15 1x20  0HHA  x20   Standing Step Stretch          HR/TR 1x20 ea 1x10 ea  1x15 1x20 ea   1x20 ea             Supine Tband DF Yellow   1x15 Yellow  1x15  Yellow 1x10  Yellow  1x10   Supine Tband PF Yellow  1x15 Yellow  1x10  Yellow   1x10   Yellow  1x10   Seated wobble board F/B  S/S  1x20 ea F/B  S/S  1x20 ea  F/S S/S  1x10 ea F/B  S/S  1x10 ea   F/B  S/S  1x10 ea   Towel inv/ever 1x30 ea  1x15 ea  1x15 ea  1x20 ea   1x20 ea   Towel DF/PF         Toe squeezes 1x30 1x15  1x15 1x20  1x20   Ankle ABCs X 2 X 1 x1 X 1   x1   Seated TR/HR         LAQ 3\" 1x20 3\" 1x20 3\" 1x15 3\" 1x15   3\" x15                             Standing Runner's Stretch  20\"x 3    20\"x 3   20\" x3   Long sit Calf Stretch           "                   Ther Activity                             Gait Training                             Modalities

## 2025-04-21 ENCOUNTER — OFFICE VISIT (OUTPATIENT)
Dept: PHYSICAL THERAPY | Facility: HOME HEALTHCARE | Age: 65
End: 2025-04-21
Attending: STUDENT IN AN ORGANIZED HEALTH CARE EDUCATION/TRAINING PROGRAM
Payer: MEDICARE

## 2025-04-21 DIAGNOSIS — M76.62 ACHILLES TENDINITIS OF LEFT LOWER EXTREMITY: ICD-10-CM

## 2025-04-21 DIAGNOSIS — M79.672 LEFT FOOT PAIN: Primary | ICD-10-CM

## 2025-04-21 PROCEDURE — 97140 MANUAL THERAPY 1/> REGIONS: CPT

## 2025-04-21 PROCEDURE — 97110 THERAPEUTIC EXERCISES: CPT

## 2025-04-21 NOTE — PROGRESS NOTES
"Daily Note     Today's date: 2025  Patient name: Maricruz Alfaro  : 1960  MRN: [id number]  Referring provider: Lata Delaney DPM  Dx:   Encounter Diagnosis     ICD-10-CM    1. Left foot pain  M79.672       2. Achilles tendinitis of left lower extremity  M76.62           Start Time: 918  Stop Time: 1004  Total time in clinic (min): 46 minutes    Subjective: Pt states that she has had improvements regarding pain in L foot since starting skilled therapy.  Pt states that she still has pain/ stiffness in mornings but is able to return to longer walking.        Objective: See treatment diary below      Assessment: Pt tolerated treatment session w/o increase in L foot pain.  PT educated pt on seated wobble board CW and CCW rotation, pt needed increased cueing and visual learning to understand.  Pt was able to perform but struggled due to decreased eversion ROM.  Pt w/ improved ability of performing SLS on L, was able to hold on foam for 20,\" continue w/ SLS exercises.      Plan: Continue per plan of care.      Precautions: n/a    Visit Count 6 7 8 4 5       Manuals   4-9   L medial foot massage JK EC DF JK  EC   L  Ankle stretching (gastroc) JK EC DF JK  EC                       Neuro Re-Ed      4    Romberg EO, EC Foam EO/EC  30\"x1 ea  0 HHA Foam EO/EC  30\" x1 ea  0HHA Foam EC  30\" x1 ea  0HHA Firm EO/EC  30\"x 1 ea   Firm EO/EC  30\" x1 ea    tandem stance Firm EO  25\"x1 ea R/L  0 HHA Firm EO  25\" x1 ea R/L Foam EO  30\" x1 ea R/L       SLS  20\" x3  L only 30\" x1 R/L firm    25\" x 1 R/L on foam      Ther Ex      4-   Nustep L1 10' L2  10'  L4  10' L1 10'  L1  10'   3 way SLR 1x20 ea Edgar  1x20 ea Edgar  On foam 1 HHA  1x15 edgar 1x20 ea Edgar   1x20 ea edgar   Standing Marches X 20  1x20 Edgar  On foam 1 HHA 1x15 1x20  0HHA  x20   Standing Step Stretch          HR/TR 1x20 ea 1x10 ea  On foam 1x15 1x20 ea   1x20 ea             Supine Tband DF Yellow   1x15 Yellow  1x15 Blue 1x15 " "ea  DF/PF  Inv/Ever Yellow 1x10  Yellow  1x10   Supine Tband PF Yellow  1x15 Yellow  1x10  Yellow   1x10   Yellow  1x10   Seated wobble board F/B  S/S  1x20 ea F/B  S/S  1x20 ea  F/B  S/S  CW/CCW  1x10 ea F/B  S/S  1x10 ea   F/B  S/S  1x10 ea   Towel inv/ever 1x30 ea  1x15 ea   1x20 ea   1x20 ea   Towel DF/PF         Toe squeezes 1x30 1x15   1x20  1x20   Ankle ABCs X 2 X 1 X1 LLE X 1   x1   Seated TR/HR         LAQ 3\" 1x20 3\" 1x20  3\" 1x15   3\" x15                             Standing Runner's Stretch  20\"x 3    20\"x 3   20\" x3   Long sit Calf Stretch                             Ther Activity                             Gait Training                             Modalities                                                "

## 2025-04-24 ENCOUNTER — OFFICE VISIT (OUTPATIENT)
Dept: PHYSICAL THERAPY | Facility: HOME HEALTHCARE | Age: 65
End: 2025-04-24
Payer: MEDICARE

## 2025-04-24 DIAGNOSIS — M79.672 LEFT FOOT PAIN: Primary | ICD-10-CM

## 2025-04-24 DIAGNOSIS — M76.62 ACHILLES TENDINITIS OF LEFT LOWER EXTREMITY: ICD-10-CM

## 2025-04-24 PROCEDURE — 97110 THERAPEUTIC EXERCISES: CPT

## 2025-04-24 PROCEDURE — 97140 MANUAL THERAPY 1/> REGIONS: CPT

## 2025-04-24 NOTE — PROGRESS NOTES
"Daily Note     Today's date: 2025  Patient name: Maricruz Alfaro  : 1960  MRN: [id number]  Referring provider: Lata Delaney DPM  Dx:   Encounter Diagnosis     ICD-10-CM    1. Left foot pain  M79.672       2. Achilles tendinitis of left lower extremity  M76.62                      Subjective: Pt reports she is having more pain in her L foot today and thinks she may have overdid it yesterday.       Objective: See treatment diary below      Assessment: Tolerated treatment well. Verbal cues needed for correct from with exercises. Unsteadiness noted during SLS on foam. No increased pain reported L foot t/o session or at end of session. Patient would benefit from continued PT      Plan: Continue per plan of care.      Precautions: n/a    Visit Count 6 7 8 9 5       Manuals    L medial foot massage JK EC DF JK  EC   L  Ankle stretching (gastroc) JK EC DF JK  EC                       Neuro Re-Ed      Romberg EO, EC Foam EO/EC  30\"x1 ea  0 HHA Foam EO/EC  30\" x1 ea  0HHA Foam EC  30\" x1 ea  0HHA Firm EC  30\"x 1 ea   0 HHA  Firm EO/EC  30\" x1 ea    tandem stance Firm EO  25\"x1 ea R/L  0 HHA Firm EO  25\" x1 ea R/L Foam EO  30\" x1 ea R/L Foam EO  30\"x 1 ea R/L      SLS  20\" x3  L only 30\" x1 R/L firm    25\" x 1 R/L on foam 30\"x 1 R/L firm    30\" x 1 R/L foam     Ther Ex      49   Nustep L1 10' L2  10'  L4  10' L4 10'  L1  10'   3 way SLR 1x20 ea Edgar  1x20 ea Edgar  On foam 1 HHA  1x15 edgar 1x20 ea Edgar   1x20 ea edgar   Standing Marches X 20  1x20 Edgar  On foam 1 HHA 1x15 1x20 foam   0HHA  x20   Standing Step Stretch          HR/TR 1x20 ea 1x10 ea  On foam 1x15 1x15 foam   1x20 ea             Supine Tband DF Yellow   1x15 Yellow  1x15 Blue 1x15 ea  DF/PF  Inv/Ever Blue 1x15 ea   DF/PF  Inv/Ever  Yellow  1x10   Supine Tband PF Yellow  1x15 Yellow  1x10    Yellow  1x10   Seated wobble board F/B  S/S  1x20 ea F/B  S/S  1x20 ea  F/B  S/S  CW/CCW  1x10 ea F/B  S/S   CW/CCW  1x10 ea   F/B  " "S/S  1x10 ea   Towel inv/ever 1x30 ea  1x15 ea     1x20 ea   Towel DF/PF         Toe squeezes 1x30 1x15     1x20   Ankle ABCs X 2 X 1 X1 LLE X 1   x1   Seated TR/HR         LAQ 3\" 1x20 3\" 1x20    3\" x15                             Standing Runner's Stretch  20\"x 3      20\" x3   Long sit Calf Stretch                             Ther Activity                             Gait Training                             Modalities                                                  "

## 2025-04-28 ENCOUNTER — EVALUATION (OUTPATIENT)
Dept: PHYSICAL THERAPY | Facility: HOME HEALTHCARE | Age: 65
End: 2025-04-28
Attending: STUDENT IN AN ORGANIZED HEALTH CARE EDUCATION/TRAINING PROGRAM
Payer: MEDICARE

## 2025-04-28 DIAGNOSIS — M79.672 LEFT FOOT PAIN: Primary | ICD-10-CM

## 2025-04-28 DIAGNOSIS — M76.62 ACHILLES TENDINITIS OF LEFT LOWER EXTREMITY: ICD-10-CM

## 2025-04-28 PROCEDURE — 97140 MANUAL THERAPY 1/> REGIONS: CPT

## 2025-04-28 PROCEDURE — 97110 THERAPEUTIC EXERCISES: CPT

## 2025-04-28 NOTE — PROGRESS NOTES
PT Re-Evaluation     Today's date: 2025  Patient name: Maricruz Alfaro  : 1960  MRN: [id number]  Referring provider: Lata Delaney DPM  Dx:   Encounter Diagnosis     ICD-10-CM    1. Left foot pain  M79.672       2. Achilles tendinitis of left lower extremity  M76.62           Start Time: 1001  Stop Time: 1045  Total time in clinic (min): 44 minutes    Assessment  Impairments: abnormal or restricted ROM, activity intolerance, lacks appropriate home exercise program, pain with function, weight-bearing intolerance and activity limitations    Assessment details: Re-Eval 25:  Maricruz Alfaro is a 64 y.o. female presenting to OP PT clinic in [place] w/ referral for L foot pain, L achilles tendonitis.  Pt presents w/ improvements in L ankle ROM since IE, decreased pain, and improved tolerance to WB and ambulation.  Pt still having issues regarding L achilles tendon TTP, decreased inv/ ever ROM of L ankle, pain w/ initial steps in morning & after seated for prolonged period of time.  Pt would benefit from continued skilled therapy to address impairments, allowing pt to perform ADLs and recreational activities w/o restriction or pain to improve QoL and return to OF.  Thank you for this referral!      Maricruz Alfaro is a 64 y.o. female presenting to OP PT clinic in [place] w/ referral for L foot pain, L achilles tendonitis.  Pt presents w/ L foot abnormalities compared to R foot including forefoot varus causing high arch of L foot, elevated 1st metatarsal of L foot, & bone spurs on L calcaneus at distal achilles.  Pt w/ decreased P/AROM in L ankle motion, pain w/ initial ambulation from seated position, stair climbing, and pain w/ function.  Pt has difficulty performing ADLs and recreational activities due to above mentioned deficits.  PT discussed potential shoe change w/ appropriate insoles providing arch support, PT supplied pt w/ recommendation for shoe store that focuses on running shoes.  Pt would benefit  from skilled therapy to address impairments, allowing pt to perform ADLs and recreational activities w/o restriction or pain to improve QoL and return to PLOF.  PT gave pt HEP focusing on performing exercises/ activities outside of the clinic to further improve and address impairments.  Thank you for this referral!      Understanding of Dx/Px/POC: good     Prognosis: good    Goals  STG:  -Pt will improve pain from 7/10 to 4/10 at worst to allow reduced difficulty performing ADLs due to pain in 4 weeks.  -Pt will increase L ankle DF AROM from 12* to >15* to allow pt w/ improved ability to ascend/descend stairs w/ less difficulty in 4 weeks.    LTG:  -Pt will be d/c from OP PT w/ I HEP to allow pt to continue improving upon their impairments for improved ADLs/ recreational activities in 12 weeks.  -Pt will improve initial walking pain from intermittent to abolished to allow improved ability to perform ADLs/ recreational activities w/o need for a rest break in 12 weeks.  -Pt will improve stair negotiation from non-reciprocal gait pattern to reciprocal gait pattern to show increased independence to return to PLOF in 12 weeks.     Plan  Patient would benefit from: skilled physical therapy  Planned modality interventions: cryotherapy, neuromuscular electric stimulation and TENS    Planned therapy interventions: joint mobilization, manual therapy, massage, neuromuscular re-education, strengthening, stretching, therapeutic activities, therapeutic exercise, home exercise program, gait training, flexibility, functional ROM exercises, balance/weight bearing training and balance    Frequency: 2x week  Duration in weeks: 12  Plan of Care beginning date: 3/26/2025  Plan of Care expiration date: 6/18/2025  Treatment plan discussed with: patient  Plan details: Begin POC focusing on addressing & improving impairments listed in eval.        Subjective Evaluation    History of Present Illness  Date of onset: 8/1/2024  Mechanism of  injury: Re-Eval 25:  Pain is in same location, decreased in frequency and level but still not abolished.  Pt still having most pain in the morning, most mornings but not all, also has pain after being seated..      Pt states that L foot pain has been going on for roughly 8 months.  Has [place] steven posterior, medial heel traveling into medial midfoot.  Pt has most difficulty due to pain in L foot when performing initial gait from after a seated position and waking up in the morning.  Pt regularly walks and has no difficulty until a rest period.  Pt primarily wears flip-flops and footwear w/o high arch insoles, especially during the summer.  Pt wants to return to recreational biking and ambulation w/o pain.  Pt saw Dr. Delaney w/ Xrays showing 1st MT elevation and calcaneal spurs.  Tylenol PRN  Patient Goals  Patient goals for therapy: increased strength, decreased pain, increased motion, independence with ADLs/IADLs and return to sport/leisure activities    Pain  Current pain ratin  At best pain ratin  At worst pain ratin  Location: L heel/ foot  Quality: needle-like and throbbing  Relieving factors: relaxation, rest, change in position and medications  Progression: improved    Social Support  Steps to enter house: yes  Stairs in house: yes     Employment status: not working    Diagnostic Tests  X-ray: abnormal  Treatments  Current treatment: physical therapy        Objective     Tenderness   Left Ankle/Foot   Tenderness in the Achilles insertion. No tenderness in the first metatarsal head and plantar fascia.     Neurological Testing     Sensation     Ankle/Foot   Left Ankle/Foot   Intact: light touch    Right Ankle/Foot   Intact: light touch     Active Range of Motion   Left Ankle/Foot   Dorsiflexion (ke): 20 degrees   Plantar flexion: 35 degrees   Inversion: 20 degrees   Eversion: 18 degrees   Great toe flexion: WFL  Great toe extension: WFL    Right Ankle/Foot   Dorsiflexion (ke): 15 degrees   Plantar  "flexion: 28 degrees   Inversion: WFL  Eversion: WFL    Passive Range of Motion   Left Ankle/Foot    Dorsiflexion (ke): 20 degrees   Plantar flexion: 35 degrees   Inversion: 25 degrees   Eversion: 22 degrees     Right Ankle/Foot  Normal passive range of motion    Strength/Myotome Testing     Left Ankle/Foot   Normal strength    Right Ankle/Foot   Normal strength    Tests   Left Ankle/Foot   Positive for varus tilt and forefoot varus.     General Comments:      Ankle/Foot Comments   Pt w/ elevated 1st Metatarsal of L foot.    High arch of L foot    Posterior calcaneal spurs along distal achilles tendon             Precautions: n/a    Visit Count 6 7 8 9 1 RE       Manuals 4/14 4-17 4-21 4/24 4/28   L medial foot massage JK EC DF JK DF   L  Ankle stretching (gastroc) JK EC DF JK DF                       Neuro Re-Ed  4-17    D/C Balance    Romberg EO, EC Foam EO/EC  30\"x1 ea  0 HHA Foam EO/EC  30\" x1 ea  0HHA Foam EC  30\" x1 ea  0HHA Firm EC  30\"x 1 ea   0 HHA      tandem stance Firm EO  25\"x1 ea R/L  0 HHA Firm EO  25\" x1 ea R/L Foam EO  30\" x1 ea R/L Foam EO  30\"x 1 ea R/L      SLS  20\" x3  L only 30\" x1 R/L firm    25\" x 1 R/L on foam 30\"x 1 R/L firm    30\" x 1 R/L foam                     Ther Ex  4-17       Nustep L1 10' L2  10'  L4  10' L4 10'  D/C Nustep Add Standard Bike   Standard Bike     L3 10'     3 way SLR 1x20 ea Edgar  1x20 ea Edgar  On foam 1 HHA  1x15 edgar 1x20 ea Edgar   On foam 1 HHA  1x20 edgar   Standing Marches X 20  1x20 Edgar  On foam 1 HHA 1x15 1x20 foam   0HHA  1x20 foam   0HHA   Standing Step Stretch          HR/TR 1x20 ea 1x10 ea  On foam 1x15 1x15 foam   1x20 foam   1HHA             Supine Tband DF Yellow   1x15 Yellow  1x15 Blue 1x15 ea  DF/PF  Inv/Ever Blue 1x15 ea   DF/PF  Inv/Ever  Blue 1x15 ea   DF/PF  Inv/Ever   Supine Tband PF Yellow  1x15 Yellow  1x10       Seated wobble board F/B  S/S  1x20 ea F/B  S/S  1x20 ea  F/B  S/S  CW/CCW  1x10 ea F/B  S/S   CW/CCW  1x10 ea   F/B  S/S  1x20 ea   Towel " "inv/ever 1x30 ea  1x15 ea       Towel DF/PF        Toe squeezes 1x30 1x15    1x20   Ankle ABCs X 2 X 1 X1 LLE X 1   x1   Seated TR/HR         LAQ 3\" 1x20 3\" 1x20    3\" x15                             Standing Runner's Stretch  20\"x 3      30\" x4   Long sit Calf Stretch                   Re-Eval Tests & Measures     DF             Ther Activity                             Gait Training                             Modalities                                      "

## 2025-05-01 ENCOUNTER — APPOINTMENT (OUTPATIENT)
Dept: PHYSICAL THERAPY | Facility: HOME HEALTHCARE | Age: 65
End: 2025-05-01
Attending: STUDENT IN AN ORGANIZED HEALTH CARE EDUCATION/TRAINING PROGRAM
Payer: MEDICARE

## 2025-05-05 ENCOUNTER — OFFICE VISIT (OUTPATIENT)
Dept: PHYSICAL THERAPY | Facility: HOME HEALTHCARE | Age: 65
End: 2025-05-05
Attending: STUDENT IN AN ORGANIZED HEALTH CARE EDUCATION/TRAINING PROGRAM
Payer: MEDICARE

## 2025-05-05 DIAGNOSIS — M79.672 LEFT FOOT PAIN: Primary | ICD-10-CM

## 2025-05-05 DIAGNOSIS — M76.62 ACHILLES TENDINITIS OF LEFT LOWER EXTREMITY: ICD-10-CM

## 2025-05-05 PROCEDURE — 97140 MANUAL THERAPY 1/> REGIONS: CPT | Performed by: PHYSICAL THERAPIST

## 2025-05-05 PROCEDURE — 97110 THERAPEUTIC EXERCISES: CPT | Performed by: PHYSICAL THERAPIST

## 2025-05-05 NOTE — PROGRESS NOTES
"Daily Note     Today's date: 2025  Patient name: Maricruz Alfaro  : 1960  MRN: [id number]  Referring provider: Lata Delaney DPM  Dx:   Encounter Diagnosis     ICD-10-CM    1. Left foot pain  M79.672       2. Achilles tendinitis of left lower extremity  M76.62                      Subjective: Pt reporting that she has good and bad days; still gets sore when getting up if she sits for too long.       Objective: See treatment diary below      Assessment: Tolerable to TE program without c/o pain during session.  Remaining tightness in all planes noted during manual stretching to the L ankle. Pt would benefit from continued therapy to work on remaining limitations and return to PLOF.       Plan: Continue per plan of care.      Precautions: n/a    Visit Count 2 7 8 9 1 RE       Manuals    L medial foot massage  EC DF JK DF   L  Ankle stretching (gastroc) HZ EC DF JK DF                       Neuro Re-Ed      D/C Balance    Romberg EO, EC  Foam EO/EC  30\" x1 ea  0HHA Foam EC  30\" x1 ea  0HHA Firm EC  30\"x 1 ea   0 HHA      tandem stance  Firm EO  25\" x1 ea R/L Foam EO  30\" x1 ea R/L Foam EO  30\"x 1 ea R/L      SLS  20\" x3  L only 30\" x1 R/L firm    25\" x 1 R/L on foam 30\"x 1 R/L firm    30\" x 1 R/L foam                     Ther Ex         Nustep  L2  10'  L4  10' L4 10'  D/C Nustep Add Standard Bike   Standard Bike L3 10'     L3 10'     3 way SLR Foam   1 HHA   X 20 edgar  1x20 ea Edgar  On foam 1 HHA  1x15 edgar 1x20 ea Edgar   On foam 1 HHA  1x20 edgar   Standing Marches X 20 foam    1x20 Edgar  On foam 1 HHA 1x15 1x20 foam   0HHA  1x20 foam   0HHA   Standing Step Stretch          HR/TR X 10 foam   1 HHA  1x10 ea  On foam 1x15 1x15 foam   1x20 foam   1HHA             Supine Tband DF Blue  x 20 ea  DF/PF   In/Ev Yellow  1x15 Blue 1x15 ea  DF/PF  Inv/Ever Blue 1x15 ea   DF/PF  Inv/Ever  Blue 1x15 ea   DF/PF  Inv/Ever   Supine Tband PF  Yellow  1x10       Seated wobble board F/B  S/S   X 20 ea  " "F/B  S/S  1x20 ea  F/B  S/S  CW/CCW  1x10 ea F/B  S/S   CW/CCW  1x10 ea   F/B  S/S  1x20 ea   Towel inv/ever  1x15 ea       Towel DF/PF        Toe squeezes  1x15    1x20   Ankle ABCs X 1  X 1 X1 LLE X 1   x1   Seated TR/HR         LAQ 3\" x 20  3\" 1x20    3\" x15                             Standing Runner's Stretch  20\" x 4      30\" x4   Long sit Calf Stretch                   Re-Eval Tests & Measures     DF             Ther Activity                             Gait Training                             Modalities                                      "

## 2025-05-07 ENCOUNTER — DOCUMENTATION (OUTPATIENT)
Dept: ADMINISTRATIVE | Facility: OTHER | Age: 65
End: 2025-05-07

## 2025-05-07 NOTE — PROGRESS NOTES
05/07/25 7:58 AM    Osteoporosis Management Post Fracture outreach is not required; patient has a recently completed DEXA on file and a new order is not needed at this time.    Thank you.  MELINDA PEARSON MA  [facility]

## 2025-05-08 ENCOUNTER — OFFICE VISIT (OUTPATIENT)
Dept: PHYSICAL THERAPY | Facility: HOME HEALTHCARE | Age: 65
End: 2025-05-08
Attending: STUDENT IN AN ORGANIZED HEALTH CARE EDUCATION/TRAINING PROGRAM
Payer: MEDICARE

## 2025-05-08 DIAGNOSIS — M76.62 ACHILLES TENDINITIS OF LEFT LOWER EXTREMITY: ICD-10-CM

## 2025-05-08 DIAGNOSIS — M79.672 LEFT FOOT PAIN: Primary | ICD-10-CM

## 2025-05-08 PROCEDURE — 97110 THERAPEUTIC EXERCISES: CPT

## 2025-05-08 PROCEDURE — 97140 MANUAL THERAPY 1/> REGIONS: CPT

## 2025-05-12 ENCOUNTER — OFFICE VISIT (OUTPATIENT)
Dept: PHYSICAL THERAPY | Facility: HOME HEALTHCARE | Age: 65
End: 2025-05-12
Attending: STUDENT IN AN ORGANIZED HEALTH CARE EDUCATION/TRAINING PROGRAM
Payer: MEDICARE

## 2025-05-12 DIAGNOSIS — M76.62 ACHILLES TENDINITIS OF LEFT LOWER EXTREMITY: ICD-10-CM

## 2025-05-12 DIAGNOSIS — M79.672 LEFT FOOT PAIN: Primary | ICD-10-CM

## 2025-05-12 PROCEDURE — 97140 MANUAL THERAPY 1/> REGIONS: CPT | Performed by: PHYSICAL THERAPIST

## 2025-05-12 PROCEDURE — 97112 NEUROMUSCULAR REEDUCATION: CPT | Performed by: PHYSICAL THERAPIST

## 2025-05-12 PROCEDURE — 97110 THERAPEUTIC EXERCISES: CPT | Performed by: PHYSICAL THERAPIST

## 2025-05-12 NOTE — PROGRESS NOTES
"Daily Note     Today's date: 2025  Patient name: Maricruz Alfaro  : 1960  MRN: [id number]  Referring provider: Lata Delaney DPM  Dx:   Encounter Diagnosis     ICD-10-CM    1. Left foot pain  M79.672       2. Achilles tendinitis of left lower extremity  M76.62                      Subjective: Pt reporting that she feels pretty good this morning.       Objective: See treatment diary below      Assessment: Pt without c/o increased pain during session. She remains challenged with In/Ev movement patterns, as well as tightness in gastroc limiting DF movement pattern. Continue with POC to address limited mobility and continued gait dysfunctions.     Plan: Continue per plan of care.      Precautions: n/a    Visit Count 2 3 4 9 1 RE       Manuals    L medial foot massage    JK DF   L  Ankle stretching (gastroc) HZ JK HZ  JK DF                       Neuro Re-Ed      D/C Balance    Romberg EO, EC    Firm EC  30\"x 1 ea   0 HHA      tandem stance    Foam EO  30\"x 1 ea R/L      SLS    30\"x 1 R/L firm    30\" x 1 R/L foam                     Ther Ex         Nustep    L4 10'  D/C Nustep Add Standard Bike   Standard Bike L3 10'  L3  10 min  L3 10 minutes   L3 10'     3 way SLR Foam   1 HHA   X 20 edgar  1x20 ea Edgar   Foam   1 HHA  X 20 ea edgar   Foam   1 HHA  1x20 ea Edgar   On foam 1 HHA  1x20 edgar   Standing Marches X 20 foam    1x20 Edgar foam  X 20 Foam   1 HHA  1x20 foam   0HHA  1x20 foam   0HHA   Standing Step Stretch          HR/TR X 10 foam   1 HHA  1x20 ea foam   1 HHA  X 20 ea form   1 HHA  1x15 foam   1x20 foam   1HHA             Supine Tband DF Blue  x 20 ea  DF/PF   In/Ev Blue x 20ea   DF/PF  Inv/Ever Blue x 20   DF/PF   In/Ev  Blue 1x15 ea   DF/PF  Inv/Ever  Blue 1x15 ea   DF/PF  Inv/Ever   Supine Tband PF         Seated wobble board F/B  S/S   X 20 ea  F/B  S/S  1x20 ea  F/B S/S   X 20  F/B  S/S   CW/CCW  1x10 ea   F/B  S/S  1x20 ea   Towel inv/ever        Towel DF/PF        Toe squeezes     " "1x20   Ankle ABCs X 1  X 1 X 1  X 1   x1   Seated TR/HR         LAQ 3\" x 20  3\" 1x20 5\" x 20    3\" x15                             Standing Runner's Stretch  20\" x 4  20\"x 4  20\" x 4    30\" x4   Long sit Calf Stretch                   Re-Eval Tests & Measures     DF             Ther Activity                             Gait Training                             Modalities                                          "

## 2025-05-14 ENCOUNTER — OFFICE VISIT (OUTPATIENT)
Dept: PHYSICAL THERAPY | Facility: HOME HEALTHCARE | Age: 65
End: 2025-05-14
Attending: STUDENT IN AN ORGANIZED HEALTH CARE EDUCATION/TRAINING PROGRAM
Payer: MEDICARE

## 2025-05-14 DIAGNOSIS — M76.62 ACHILLES TENDINITIS OF LEFT LOWER EXTREMITY: ICD-10-CM

## 2025-05-14 DIAGNOSIS — M79.672 LEFT FOOT PAIN: Primary | ICD-10-CM

## 2025-05-14 PROCEDURE — 97140 MANUAL THERAPY 1/> REGIONS: CPT

## 2025-05-14 PROCEDURE — 97110 THERAPEUTIC EXERCISES: CPT

## 2025-05-14 NOTE — PROGRESS NOTES
"Daily Note     Today's date: 2025  Patient name: Maricruz Alfaro  : 1960  MRN: [id number]  Referring provider: Lata Delaney DPM  Dx:   Encounter Diagnosis     ICD-10-CM    1. Left foot pain  M79.672       2. Achilles tendinitis of left lower extremity  M76.62                  Subjective: Pt reports she has a little pain L medial ankle.       Objective: See treatment diary below      Assessment: Tolerated treatment well. Pt still challenged with inv/ever movement patterns. Pt with no c/o increased pain L ankle t/o session. Patient would benefit from continued PT      Plan: Continue per plan of care.      Precautions: n/a    Visit Count 2 3 4 5 1 RE       Manuals    L medial foot massage     DF   L  Ankle stretching (gastroc) HZ JK HZ  JK DF                       Neuro Re-Ed      D/C Balance    Romberg EO, EC          tandem stance          SLS                         Ther Ex         Nustep      D/C Nustep Add Standard Bike   Standard Bike L3 10'  L3  10 min  L3 10 minutes  L3  10 min  L3 10'     3 way SLR Foam   1 HHA   X 20 edgar  1x20 ea Edgar   Foam   1 HHA  X 20 ea edgar   Foam   1 HHA  1x20 ea Edgar   Foam   1 HHA   On foam 1 HHA  1x20 edgar   Standing Marches X 20 foam    1x20 Edgar foam  X 20 Foam   1 HHA  1x20 foam   1HHA  1x20 foam   0HHA   Standing Step Stretch          HR/TR X 10 foam   1 HHA  1x20 ea foam   1 HHA  X 20 ea form   1 HHA  1x20 ea  foam   1 HHA   1x20 foam   1HHA             Supine Tband DF Blue  x 20 ea  DF/PF   In/Ev Blue x 20ea   DF/PF  Inv/Ever Blue x 20   DF/PF   In/Ev  Blue 1x20 ea   DF/PF  Inv/Ever  Blue 1x15 ea   DF/PF  Inv/Ever   Supine Tband PF         Seated wobble board F/B  S/S   X 20 ea  F/B  S/S  1x20 ea  F/B S/S   X 20  F/B  S/S   CW/CCW  1x20 ea   F/B  S/S  1x20 ea   Towel inv/ever        Towel DF/PF        Toe squeezes     1x20   Ankle ABCs X 1  X 1 X 1  X 1   x1   Seated TR/HR         LAQ 3\" x 20  3\" 1x20 5\" x 20  5\"x20  3\" x15                         " "    Standing Runner's Stretch  20\" x 4  20\"x 4  20\" x 4  20\" x4  30\" x4   Long sit Calf Stretch                   Re-Eval Tests & Measures     DF             Ther Activity                             Gait Training                             Modalities                                            "

## 2025-05-15 ENCOUNTER — APPOINTMENT (OUTPATIENT)
Dept: PHYSICAL THERAPY | Facility: HOME HEALTHCARE | Age: 65
End: 2025-05-15
Attending: STUDENT IN AN ORGANIZED HEALTH CARE EDUCATION/TRAINING PROGRAM
Payer: MEDICARE

## 2025-05-19 ENCOUNTER — OFFICE VISIT (OUTPATIENT)
Dept: PHYSICAL THERAPY | Facility: HOME HEALTHCARE | Age: 65
End: 2025-05-19
Attending: STUDENT IN AN ORGANIZED HEALTH CARE EDUCATION/TRAINING PROGRAM
Payer: MEDICARE

## 2025-05-19 DIAGNOSIS — M76.62 ACHILLES TENDINITIS OF LEFT LOWER EXTREMITY: ICD-10-CM

## 2025-05-19 DIAGNOSIS — M79.672 LEFT FOOT PAIN: Primary | ICD-10-CM

## 2025-05-19 PROCEDURE — 97110 THERAPEUTIC EXERCISES: CPT

## 2025-05-19 PROCEDURE — 97140 MANUAL THERAPY 1/> REGIONS: CPT

## 2025-05-19 NOTE — PROGRESS NOTES
"Daily Note     Today's date: 2025  Patient name: Maricruz Alfaro  : 1960  MRN: [id number]  Referring provider: Lata Delaney DPM  Dx:   Encounter Diagnosis     ICD-10-CM    1. Left foot pain  M79.672       2. Achilles tendinitis of left lower extremity  M76.62                      Subjective: Pt reports she gets pain where the \"lump\" is on and off t/o the day.       Objective: See treatment diary below      Assessment: Tolerated treatment well. Pt Raissa Ramirez checked Pt's L ankle with tenderness and lump noted on  L calcaneus at distal achilles. Per PT will add STM to L distal achilles next visit.  Patient would benefit from continued PT      Plan: Continue per plan of care.      Precautions: n/a    Visit Count 2 3 4 5 6       Manuals    L medial foot massage     STM L distal achilles NV   L  Ankle stretching (gastroc) HZ JK HZ  JK JK                       Neuro Re-Ed         Romberg EO, EC          tandem stance          SLS                         Ther Ex         Nustep        Standard Bike L3 10'  L3  10 min  L3 10 minutes  L3  10 min  L3 10'     3 way SLR Foam   1 HHA   X 20 dave  1x20 ea Dave   Foam   1 HHA  X 20 ea dave   Foam   1 HHA  1x20 ea Dave   Foam   1 HHA   On foam 1 HHA  1x20 ea dave   Standing Marches X 20 foam    1x20 Dave foam  X 20 Foam   1 HHA  1x20 foam   1HHA  1x20 foam   0HHA   Standing Step Stretch          HR/TR X 10 foam   1 HHA  1x20 ea foam   1 HHA  X 20 ea form   1 HHA  1x20 ea  foam   1 HHA   1x20 foam   1HHA             Supine Tband DF Blue  x 20 ea  DF/PF   In/Ev Blue x 20ea   DF/PF  Inv/Ever Blue x 20   DF/PF   In/Ev  Blue 1x20 ea   DF/PF  Inv/Ever  Blue 1x20 ea   DF/PF  Inv/Ever   Supine Tband PF         Seated wobble board F/B  S/S   X 20 ea  F/B  S/S  1x20 ea  F/B S/S   X 20  F/B  S/S   CW/CCW  1x20 ea   F/B  S/S   1x20 ea   Towel inv/ever        Towel DF/PF        Toe squeezes        Ankle ABCs X 1  X 1 X 1  X 1   x1   Seated TR/HR         LAQ 3\" x " "20  3\" 1x20 5\" x 20  5\"x20  3\" x 20                             Standing Runner's Stretch  20\" x 4  20\"x 4  20\" x 4  20\" x4  20\" x4   Long sit Calf Stretch                   Re-Eval Tests & Measures                  Ther Activity                             Gait Training                             Modalities                                              "

## 2025-05-22 ENCOUNTER — OFFICE VISIT (OUTPATIENT)
Dept: PHYSICAL THERAPY | Facility: HOME HEALTHCARE | Age: 65
End: 2025-05-22
Attending: STUDENT IN AN ORGANIZED HEALTH CARE EDUCATION/TRAINING PROGRAM
Payer: MEDICARE

## 2025-05-22 DIAGNOSIS — M76.62 ACHILLES TENDINITIS OF LEFT LOWER EXTREMITY: ICD-10-CM

## 2025-05-22 DIAGNOSIS — M79.672 LEFT FOOT PAIN: Primary | ICD-10-CM

## 2025-05-22 PROCEDURE — 97110 THERAPEUTIC EXERCISES: CPT

## 2025-05-22 PROCEDURE — 97140 MANUAL THERAPY 1/> REGIONS: CPT

## 2025-05-22 NOTE — PROGRESS NOTES
"Daily Note     Today's date: 2025  Patient name: Maricruz Alfaro  : 1960  MRN: [id number]  Referring provider: Lata Delaney DPM  Dx:   Encounter Diagnosis     ICD-10-CM    1. Left foot pain  M79.672       2. Achilles tendinitis of left lower extremity  M76.62                      Subjective: Pt reports she still gets pain \"on and off.\"      Objective: See treatment diary below      Assessment: Tolerated treatment well. Added STM to L distal achilles today per PT Raissa Ramirez. Tenderness noted L calcaneus at distal achilles.  No pain reported reported L ankle/foot t/o session or at end of session. Patient would benefit from continued PT      Plan: Continue per plan of care.      Precautions: n/a    Visit Count 7 3 4 5 6       Manuals    L medial foot massage STM L distal achilles   JK    STM L distal achilles NV   L  Ankle stretching (gastroc) JK JK HZ  JK JK                       Neuro Re-Ed         Romberg EO, EC          tandem stance          SLS                         Ther Ex         Nustep        Standard Bike L3 10'  L3  10 min  L3 10 minutes  L3  10 min  L3 10'     3 way SLR Foam   1 HHA   X 20 dave  1x20 ea Dave   Foam   1 HHA  X 20 ea dave   Foam   1 HHA  1x20 ea Dave   Foam   1 HHA   On foam 1 HHA  1x20 ea dave   Standing Marches X 20 foam   0 HHA  1x20 Dave foam  X 20 Foam   1 HHA  1x20 foam   1HHA  1x20 foam   0HHA   Standing Step Stretch          HR/TR X 20 foam   1 HHA  1x20 ea foam   1 HHA  X 20 ea form   1 HHA  1x20 ea  foam   1 HHA   1x20 foam   1HHA             Supine Tband DF Blue  x 20 ea  DF/PF   In/Ev Blue x 20ea   DF/PF  Inv/Ever Blue x 20   DF/PF   In/Ev  Blue 1x20 ea   DF/PF  Inv/Ever  Blue 1x20 ea   DF/PF  Inv/Ever   Supine Tband PF         Seated wobble board F/B  S/S   X 20 ea  F/B  S/S  1x20 ea  F/B S/S   X 20  F/B  S/S   CW/CCW  1x20 ea   F/B  S/S   1x20 ea   Towel inv/ever        Towel DF/PF        Toe squeezes        Ankle ABCs X 1  X 1 X 1  X 1   x1 " "  Seated TR/HR         LAQ 3\" x 20  3\" 1x20 5\" x 20  5\"x20  3\" x 20                             Standing Runner's Stretch  20\" x 4  20\"x 4  20\" x 4  20\" x4  20\" x4   Long sit Calf Stretch                   Re-Eval Tests & Measures                  Ther Activity                             Gait Training                             Modalities                                                "

## 2025-05-26 ENCOUNTER — APPOINTMENT (OUTPATIENT)
Dept: PHYSICAL THERAPY | Facility: HOME HEALTHCARE | Age: 65
End: 2025-05-26
Attending: STUDENT IN AN ORGANIZED HEALTH CARE EDUCATION/TRAINING PROGRAM
Payer: MEDICARE

## 2025-05-27 ENCOUNTER — APPOINTMENT (OUTPATIENT)
Dept: PHYSICAL THERAPY | Facility: HOME HEALTHCARE | Age: 65
End: 2025-05-27
Attending: STUDENT IN AN ORGANIZED HEALTH CARE EDUCATION/TRAINING PROGRAM
Payer: MEDICARE

## 2025-05-28 ENCOUNTER — OFFICE VISIT (OUTPATIENT)
Dept: PHYSICAL THERAPY | Facility: HOME HEALTHCARE | Age: 65
End: 2025-05-28
Attending: STUDENT IN AN ORGANIZED HEALTH CARE EDUCATION/TRAINING PROGRAM
Payer: MEDICARE

## 2025-05-28 DIAGNOSIS — M79.672 LEFT FOOT PAIN: Primary | ICD-10-CM

## 2025-05-28 DIAGNOSIS — M76.62 ACHILLES TENDINITIS OF LEFT LOWER EXTREMITY: ICD-10-CM

## 2025-05-28 PROCEDURE — 97140 MANUAL THERAPY 1/> REGIONS: CPT

## 2025-05-28 PROCEDURE — 97110 THERAPEUTIC EXERCISES: CPT

## 2025-05-28 NOTE — PROGRESS NOTES
Daily Note     Today's date: 2025  Patient name: Maricruz Alfaro  : 1960  MRN: [id number]  Referring provider: Lata Delaney DPM  Dx: No diagnosis found.           Subjective: Pt reports her L foot/ankle is feeling good today and has no pain currently. Pt reports she went for a bike ride yesterday and did good and had no pain.       Objective: See treatment diary below      Assessment: Tolerated treatment well. Pt with decreased tenderness noted L calcaneus at distal achilles. No pain reported reported L ankle/foot t/o session or at end of session. Patient would benefit from continued PT      Plan: Continue per plan of care.      Precautions: n/a    Visit Count 7 8 4 5 6       Manuals    L medial foot massage STM L distal achilles   JK STM L distal achilles   JK   STM L distal achilles NV   L  Ankle stretching (gastroc) JK JK HZ  JK JK                       Neuro Re-Ed         Romberg EO, EC          tandem stance          SLS                         Ther Ex         Nustep        Standard Bike L3 10'  L3  10 min  L3 10 minutes  L3  10 min  L3 10'     3 way SLR Foam   1 HHA   X 20 edgar  1x20 ea Edgar   Foam   1 HHA  X 20 ea edgar   Foam   1 HHA  1x20 ea Edgar   Foam   1 HHA   On foam 1 HHA  1x20 ea edgar   Standing Marches X 20 foam   0 HHA  1x20 Edgar foam   0 HHA  X 20 Foam   1 HHA  1x20 foam   1HHA  1x20 foam   0HHA   Standing Step Stretch          HR/TR X 20 foam   1 HHA  1x20 ea foam   1 HHA  X 20 ea form   1 HHA  1x20 ea  foam   1 HHA   1x20 foam   1HHA             Supine Tband DF Blue  x 20 ea  DF/PF   In/Ev Blue x 20ea   DF/PF  Inv/Ever Blue x 20   DF/PF   In/Ev  Blue 1x20 ea   DF/PF  Inv/Ever  Blue 1x20 ea   DF/PF  Inv/Ever   Supine Tband PF         Seated wobble board F/B  S/S   X 20 ea  F/B  S/S  1x20 ea  F/B S/S   X 20  F/B  S/S   CW/CCW  1x20 ea   F/B  S/S   1x20 ea   Towel inv/ever        Towel DF/PF        Toe squeezes        Ankle ABCs X 1  X 1 X 1  X 1   x1   Seated TR/HR       "   LAQ 3\" x 20  3\" 1x20 5\" x 20  5\"x20  3\" x 20                             Standing Runner's Stretch  20\" x 4  20\"x 4  20\" x 4  20\" x4  20\" x4   Long sit Calf Stretch                   Re-Eval Tests & Measures                  Ther Activity                             Gait Training                             Modalities                                                  "

## 2025-05-29 ENCOUNTER — OFFICE VISIT (OUTPATIENT)
Dept: PHYSICAL THERAPY | Facility: HOME HEALTHCARE | Age: 65
End: 2025-05-29
Attending: STUDENT IN AN ORGANIZED HEALTH CARE EDUCATION/TRAINING PROGRAM
Payer: MEDICARE

## 2025-05-29 DIAGNOSIS — M76.62 ACHILLES TENDINITIS OF LEFT LOWER EXTREMITY: ICD-10-CM

## 2025-05-29 DIAGNOSIS — M79.672 LEFT FOOT PAIN: Primary | ICD-10-CM

## 2025-05-29 PROCEDURE — 97140 MANUAL THERAPY 1/> REGIONS: CPT

## 2025-05-29 PROCEDURE — 97110 THERAPEUTIC EXERCISES: CPT

## 2025-05-29 NOTE — PROGRESS NOTES
"Daily Note     Today's date: 2025  Patient name: Maricruz Alfaro  : 1960  MRN: [id number]  Referring provider: Lata Delaney DPM  Dx:   Encounter Diagnosis     ICD-10-CM    1. Left foot pain  M79.672       2. Achilles tendinitis of left lower extremity  M76.62                      Subjective: Pt reports she is feeling good and has no pain.       Objective: See treatment diary below      Assessment: Tolerated treatment well. Patient with no c/o pain t/o session or at end of session. See discharge summary.      Plan: Pt discharged from PT.      Precautions: n/a    Visit Count 7 8 9 5 6       Manuals    L medial foot massage STM L distal achilles   JK STM L distal achilles   JK STM L distal achilles   JK  STM L distal achilles NV   L  Ankle stretching (gastroc) JK JK JK JK JK                       Neuro Re-Ed         Romberg EO, EC          tandem stance          SLS                         Ther Ex         Nustep        Standard Bike L3 10'  L3  10 min  L3 10 minutes  L3  10 min  L3 10'     3 way SLR Foam   1 HHA   X 20 edgar  1x20 ea Edgar   Foam   1 HHA  X 20 ea edgar   Foam   1 HHA  1x20 ea Edgar   Foam   1 HHA   On foam 1 HHA  1x20 ea edgar   Standing Marches X 20 foam   0 HHA  1x20 Edgar foam   0 HHA  X 20 Foam   1 HHA  1x20 foam   1HHA  1x20 foam   0HHA   Standing Step Stretch          HR/TR X 20 foam   1 HHA  1x20 ea foam   1 HHA  X 20 ea form   1 HHA  1x20 ea  foam   1 HHA   1x20 foam   1HHA             Supine Tband DF Blue  x 20 ea  DF/PF   In/Ev Blue x 20ea   DF/PF  Inv/Ever Blue x 20   DF/PF   In/Ev  Blue 1x20 ea   DF/PF  Inv/Ever  Blue 1x20 ea   DF/PF  Inv/Ever   Supine Tband PF         Seated wobble board F/B  S/S   X 20 ea  F/B  S/S  1x20 ea  F/B S/S   X 20  F/B  S/S   CW/CCW  1x20 ea   F/B  S/S   1x20 ea   Towel inv/ever        Towel DF/PF        Toe squeezes        Ankle ABCs X 1  X 1 X 1  X 1   x1   Seated TR/HR         LAQ 3\" x 20  3\" 1x20 5\" x 20  5\"x20  3\" x 20                 " "            Standing Runner's Stretch  20\" x 4  20\"x 4  20\" x 4  20\" x4  20\" x4   Long sit Calf Stretch                   Re-Eval Tests & Measures                  Ther Activity                             Gait Training                             Modalities                                                    "

## 2025-06-12 ENCOUNTER — OFFICE VISIT (OUTPATIENT)
Dept: PODIATRY | Age: 65
End: 2025-06-12
Payer: MEDICARE

## 2025-06-12 VITALS — HEIGHT: 66 IN | BODY MASS INDEX: 24.08 KG/M2 | WEIGHT: 149.8 LBS

## 2025-06-12 DIAGNOSIS — M79.672 LEFT FOOT PAIN: Primary | ICD-10-CM

## 2025-06-12 DIAGNOSIS — M76.62 ACHILLES TENDINITIS OF LEFT LOWER EXTREMITY: ICD-10-CM

## 2025-06-12 PROCEDURE — 99214 OFFICE O/P EST MOD 30 MIN: CPT | Performed by: STUDENT IN AN ORGANIZED HEALTH CARE EDUCATION/TRAINING PROGRAM

## 2025-06-12 RX ORDER — DEXAMETHASONE SODIUM PHOSPHATE 4 MG/ML
2 INJECTION, SOLUTION INTRA-ARTICULAR; INTRALESIONAL; INTRAMUSCULAR; INTRAVENOUS; SOFT TISSUE SEE ADMIN INSTRUCTIONS
Qty: 2 ML | Refills: 1 | Status: SHIPPED | OUTPATIENT
Start: 2025-06-12

## 2025-06-12 NOTE — PATIENT INSTRUCTIONS
[facility]    Dr. Owens ([place])  Dr. Benjamin or Dr. Shepherd ([place])

## 2025-06-12 NOTE — PROGRESS NOTES
"Name: Maricruz Alfaro      : 1960      MRN: [id number]  Encounter Provider: Lata Delaney DPM  Encounter Date: 2025   Encounter department: [facility] [place]  :  Assessment & Plan  Left foot pain  Achilles tendinitis of left lower extremity  - Left foot pain w/o improvement with 8wks PT  - PT notes reviewed; c/w topical pain creams, stretching, HEP  - C/w supportive soft back sneaker. Purchase vasyli-dananberg arch supports  - I ordered dexamethasone for iontophoresis in formal PT. She should do this for at least one month.  - I referred patient to sports med for potential PRP injections if ionoporesis does not work  - MRI left ankle/heel 24 reviewed: insertional achilles tendinopathy and retro-calcaneal bursitis  - I gave patient names of [facility]pod[facility] surgeons who do [place] deformity surgery if conservative care were to fail.       Orders:    Ambulatory Referral to Orthopedic Surgery; Future    dexamethasone (DECADRON) 4 mg/mL; 0.5 mL (2 mg total) by Iontophoresis route see administration instructions To be used during physical therapy for iontophoresis to left heel      Prior Imaging   XR left foot WB 3v 3/12/25: No acute osseous abnormalities noted. Elevated and elongated 1st metatarsal. Plantar and posterior calcaneal spurs.       History of Present Illness   HPI  Maricruz Alfaro is a 65 y.o. female who presents concerning left posterior heel pain f/u. Notes no improvement with PT. Pain is on and off, some good and some bad days. Notes AM is always with stiffness.     Initial HPI \"Present for 8 months. Uneven surfaces hurt worse. Pain about daily. Lots of stiffness. Some tingling, worse when she first wakes up. No back pain. No h/o trauma to area. She is an avid biker and hiker.\"       Review of Systems   Constitutional:  Negative for activity change, chills and fever.   HENT: Negative.     Respiratory:  Negative for cough, chest tightness and shortness of breath.  " "  Cardiovascular:  Negative for chest pain and leg swelling.   Endocrine: Negative.    Genitourinary: Negative.    Neurological: Negative.  Negative for numbness.   Psychiatric/Behavioral: Negative.  Negative for agitation and behavioral problems.           Objective   Ht 5' 6\" (1.676 m)   Wt 67.9 kg (149 lb 12.8 oz)   BMI 24.18 kg/m²      Physical Exam  Constitutional:       General: She is not in acute distress.     Appearance: Normal appearance. She is not ill-appearing.     Cardiovascular:      Pulses: Normal pulses.   Pulmonary:      Effort: Pulmonary effort is normal.     Musculoskeletal:         General: Tenderness (Left posterior heel at insertion of achilles to calc) and deformity (Hypertrophy of left posterior achilles at insertion) present.      Comments: MMT 5/5 in all planes. Toe and ankle ROM intact and without pain.   Gastroc-soleal equinus.      Skin:     General: Skin is warm and dry.      Findings: No erythema or lesion.     Neurological:      General: No focal deficit present.      Mental Status: She is alert and oriented to person, place, and time.      Sensory: No sensory deficit.           "

## 2025-06-13 ENCOUNTER — TELEPHONE (OUTPATIENT)
Age: 65
End: 2025-06-13

## 2025-06-13 NOTE — TELEPHONE ENCOUNTER
Caller: Patient    Doctor: Dr. NOLASCO    Reason for call:     Transfer to OP PT in [place]    Call back#: n/a

## (undated) DEVICE — DISPOSABLE OR TOWEL: Brand: CARDINAL HEALTH

## (undated) DEVICE — EXOFIN PRECISION PEN HIGH VISCOSITY TOPICAL SKIN ADHESIVE: Brand: EXOFIN PRECISION PEN, 1G

## (undated) DEVICE — INTENDED FOR TISSUE SEPARATION, AND OTHER PROCEDURES THAT REQUIRE A SHARP SURGICAL BLADE TO PUNCTURE OR CUT.: Brand: BARD-PARKER SAFETY BLADES SIZE 15, STERILE

## (undated) DEVICE — ZIMMER® STERILE DISPOSABLE TOURNIQUET CUFF, DUAL PORT, SINGLE BLADDER, 18 IN. (46 CM)

## (undated) DEVICE — DRAPE SHEET THREE QUARTER

## (undated) DEVICE — OCCLUSIVE GAUZE STRIP,3% BISMUTH TRIBROMOPHENATE IN PETROLATUM BLEND: Brand: XEROFORM

## (undated) DEVICE — CURITY NON-ADHERENT STRIPS: Brand: CURITY

## (undated) DEVICE — 10FR FRAZIER SUCTION HANDLE: Brand: CARDINAL HEALTH

## (undated) DEVICE — GLOVE INDICATOR PI UNDERGLOVE SZ 7.5 BLUE

## (undated) DEVICE — CAST PADDING 4 IN UNSTERILE

## (undated) DEVICE — COBAN 4 IN STERILE

## (undated) DEVICE — ASTOUND STANDARD SURGICAL GOWN, XL: Brand: CONVERTORS

## (undated) DEVICE — GLOVE SRG BIOGEL 7.5

## (undated) DEVICE — NEEDLE 18 G X 1 1/2

## (undated) DEVICE — 3M™ COBAN™ NL STERILE NON-LATEX SELF-ADHERENT WRAP, 2084S, 4 IN X 5 YD (10 CM X 4,5 M), 18 ROLLS/CASE: Brand: 3M™ COBAN™

## (undated) DEVICE — K-WIRE 1.1 X 100MM SS
Type: IMPLANTABLE DEVICE | Site: WRIST | Status: NON-FUNCTIONAL
Removed: 2024-11-15

## (undated) DEVICE — CHLORAPREP HI-LITE 26ML ORANGE

## (undated) DEVICE — SPLINT 3 X 15 IN XFAST SET PLASTER

## (undated) DEVICE — PADDING CAST 4 IN  COTTON STRL

## (undated) DEVICE — ARM SLING: Brand: DEROYAL

## (undated) DEVICE — C-ARM: Brand: UNBRANDED

## (undated) DEVICE — SCREW CORTICAL 2.3 X 26MM
Type: IMPLANTABLE DEVICE | Site: WRIST | Status: NON-FUNCTIONAL
Removed: 2024-11-15

## (undated) DEVICE — GUIDE MINI 1.8MM

## (undated) DEVICE — SUT ETHILON 3-0 PS-1 18 IN 1663G

## (undated) DEVICE — TUBING SUCTION 5MM X 12 FT

## (undated) DEVICE — NEEDLE 25G X 1 1/2

## (undated) DEVICE — DRAPE STERI 1010 18IN X 12IN

## (undated) DEVICE — ANTIBACTERIAL UNDYED BRAIDED (POLYGLACTIN 910), SYNTHETIC ABSORBABLE SUTURE: Brand: COATED VICRYL

## (undated) DEVICE — ALL PURPOSE SPONGES,NON-WOVEN, 4 PLY: Brand: CURITY

## (undated) DEVICE — PEG VOLAR THREADED 16MM
Type: IMPLANTABLE DEVICE | Site: WRIST | Status: NON-FUNCTIONAL
Removed: 2024-11-15

## (undated) DEVICE — GAUZE SPONGES,16 PLY: Brand: CURITY